# Patient Record
Sex: FEMALE | Race: WHITE | Employment: OTHER | URBAN - METROPOLITAN AREA
[De-identification: names, ages, dates, MRNs, and addresses within clinical notes are randomized per-mention and may not be internally consistent; named-entity substitution may affect disease eponyms.]

---

## 2023-03-20 ENCOUNTER — HOSPITAL ENCOUNTER (OUTPATIENT)
Dept: MRI IMAGING | Age: 73
Discharge: HOME | End: 2023-03-23
Payer: MEDICARE

## 2023-03-20 DIAGNOSIS — M25.552 PAIN IN LEFT HIP: ICD-10-CM

## 2023-03-20 DIAGNOSIS — M16.0 BILATERAL HIP JOINT ARTHRITIS: ICD-10-CM

## 2023-03-20 DIAGNOSIS — M25.551 BILATERAL HIP PAIN: ICD-10-CM

## 2023-03-20 DIAGNOSIS — M25.552 BILATERAL HIP PAIN: ICD-10-CM

## 2023-03-20 PROCEDURE — 73721 MRI JNT OF LWR EXTRE W/O DYE: CPT

## 2023-04-20 ENCOUNTER — HOSPITAL ENCOUNTER (OUTPATIENT)
Dept: MRI IMAGING | Age: 73
Discharge: HOME | End: 2023-04-20
Payer: MEDICARE

## 2023-04-20 DIAGNOSIS — M48.061 SPINAL STENOSIS OF LUMBAR REGION WITHOUT NEUROGENIC CLAUDICATION: ICD-10-CM

## 2023-04-20 PROCEDURE — 6360000004 HC RX CONTRAST MEDICATION: Performed by: ORTHOPAEDIC SURGERY

## 2023-04-20 PROCEDURE — A9579 GAD-BASE MR CONTRAST NOS,1ML: HCPCS | Performed by: ORTHOPAEDIC SURGERY

## 2023-04-20 PROCEDURE — 72158 MRI LUMBAR SPINE W/O & W/DYE: CPT

## 2023-04-20 RX ADMIN — GADOTERIDOL 17 ML: 279.3 INJECTION, SOLUTION INTRAVENOUS at 14:12

## 2024-12-05 NOTE — PROGRESS NOTES
technique; SCS- Strain counter strain; CTM-Connective tissue mobilizations; CR- Contract/Relax; SP- Sustained pressure; PIT- Positional inhibition techniques; STM Soft -tissue mobilization; MM- Myofascial mobilization; TrP-Trigger point release; IASTM- Instrument assisted soft tissue mobilizations, TDN-Trigger point dry needling)    Pt gives verbal consent to internal vaginal assessment/treatment without chaperon present.      Treatment/Session Summary:    Treatment Assessment:   Pt reports good understanding of plan of care, as well as prescribed home exercise program. All questions were answered to pts satisfaction. Pt was invited to call with any further questions or concerns.   Communication/Consultation:  None today  Equipment provided today:  None  Recommendations/Intent for next treatment session: Next visit will focus on   Urge suppression  Perineal massage  Toilet positioning  Manual to hips, adductors, low back, PFM  Coordination training  Mobility for low back pain and hip mobility    >Total Treatment Billable Duration:  65 minutes   Time In: 1010  Time Out: 1115    Ewelina Morrell PT         Charge Capture  Events  [facility] Portal  Appt Desk  Attendance Report     Future Appointments   Date Time Provider Department Center   12/16/2024  8:00 AM Ewelina Morrell, PT [facility]   12/26/2024  9:00 AM Ewelina Morrell, PT [facility]   12/30/2024  1:00 PM Ewelina Morrell, PT [facility]   1/6/2025  1:00 PM Ewelina Morrell, PT [facility]   1/13/2025  1:00 PM Ewelina Morrell, PT [facility]

## 2024-12-05 NOTE — THERAPY EVALUATION
Brandy Grullon  : 1950  Primary: Medicare Part A And B (Medicare)  Secondary: [facility] HEALTH BENEFIT PL [facility] @ [address]  [facility] [address]  Phone: [phone]  Fax: [phone] Plan Frequency: 1x/week for 90 days    Plan of Care/Certification Expiration Date: 03/10/25        Plan of Care/Certification Expiration Date:  Plan of Care/Certification Expiration Date: 03/10/25    Frequency/Duration: Plan Frequency: 1x/week for 90 days      Time In/Out:   Time In: 1010  Time Out: 1115    PT Visit Info:    Plan Frequency: 1x/week for 90 days  Progress Note Due Date: 03/10/25 (or 10th visit)      Visit Count:  1                OUTPATIENT PHYSICAL THERAPY:             Initial Assessment 12/10/2024               Episode (PFPT)         Treatment Diagnosis:     Lack of coordination  Mixed incontinence  Feeling of incomplete bladder emptying  Muscle weakness (generalized)  Myalgia  Muscle spasm  Contributing Diagnosis:  Fecal urgency (R15.2), Frequency of micturition (R35.0), Nocturia (R35.1), Nocturnal enuresis (N39.44), Pelvic and perineal pain (R10.2), Pelvic floor dysfunction in female (M62.98), and Urgency of urination (R39.15)  Medical/Referring Diagnosis:    Pelvic floor weakness      Referring Physician:  Gus Salinas MD MD Orders:  PT Eval and Treat   Return MD Appt:    Date of Onset:      Allergies:  Patient has no allergy information on record.  Restrictions/Precautions:    None      Medications Last Reviewed:  12/10/2024     SUBJECTIVE   History of Injury/Illness (Reason for Referral):  Ms. Grullon is a 74 yo female referred to pelvic floor physical therapy (PFPT) by Gus Salinas MD 2/2 Pelvic floor weakness [N81.89].    She just had a triple hernia repair.  She is not sleeping well at night. She gets 4-5 hours per night maybe. She has sleep apnea.     Urinary:  If she sneezes or coughs she leaks urine. She is waking up 3-5x at night

## 2024-12-10 ENCOUNTER — HOSPITAL ENCOUNTER (OUTPATIENT)
Dept: PHYSICAL THERAPY | Age: 74
Setting detail: RECURRING SERIES
Discharge: HOME | End: 2024-12-13
Payer: MEDICARE

## 2024-12-10 DIAGNOSIS — N39.46 MIXED INCONTINENCE: ICD-10-CM

## 2024-12-10 DIAGNOSIS — M79.10 MYALGIA: ICD-10-CM

## 2024-12-10 DIAGNOSIS — M62.838 MUSCLE SPASM: ICD-10-CM

## 2024-12-10 DIAGNOSIS — R39.14 FEELING OF INCOMPLETE BLADDER EMPTYING: ICD-10-CM

## 2024-12-10 DIAGNOSIS — R27.9 LACK OF COORDINATION: Primary | ICD-10-CM

## 2024-12-10 DIAGNOSIS — M62.81 MUSCLE WEAKNESS (GENERALIZED): ICD-10-CM

## 2024-12-10 PROCEDURE — 97162 PT EVAL MOD COMPLEX 30 MIN: CPT

## 2024-12-10 PROCEDURE — 97530 THERAPEUTIC ACTIVITIES: CPT

## 2024-12-10 ASSESSMENT — PAIN SCALES - GENERAL: PAINLEVEL_OUTOF10: 3

## 2024-12-16 ENCOUNTER — HOSPITAL ENCOUNTER (OUTPATIENT)
Dept: PHYSICAL THERAPY | Age: 74
Setting detail: RECURRING SERIES
Discharge: HOME | End: 2024-12-19
Payer: MEDICARE

## 2024-12-16 PROCEDURE — 97530 THERAPEUTIC ACTIVITIES: CPT

## 2024-12-16 PROCEDURE — 97110 THERAPEUTIC EXERCISES: CPT

## 2024-12-16 ASSESSMENT — PAIN SCALES - GENERAL: PAINLEVEL_OUTOF10: 0

## 2024-12-26 ENCOUNTER — HOSPITAL ENCOUNTER (OUTPATIENT)
Dept: PHYSICAL THERAPY | Age: 74
Setting detail: RECURRING SERIES
Discharge: HOME | End: 2024-12-29
Payer: MEDICARE

## 2024-12-26 PROCEDURE — 97140 MANUAL THERAPY 1/> REGIONS: CPT

## 2024-12-26 PROCEDURE — 97110 THERAPEUTIC EXERCISES: CPT

## 2024-12-26 ASSESSMENT — PAIN SCALES - GENERAL: PAINLEVEL_OUTOF10: 0

## 2024-12-26 NOTE — PROGRESS NOTES
Brandy Grullon  : 1950  Primary: Medicare Part A And B (Medicare)  Secondary: [facility] HEALTH BENEFIT PL [facility] @ [address]  [facility] [address]  Phone: [phone]  Fax: [phone] Plan Frequency: 1x/week for 90 days  Plan of Care/Certification Expiration Date: 03/10/25        Plan of Care/Certification Expiration Date:  Plan of Care/Certification Expiration Date: 03/10/25    Frequency/Duration: Plan Frequency: 1x/week for 90 days      Time In/Out:   Time In: 0900  Time Out: 0957      PT Visit Info:    Progress Note Due Date: 03/10/25 (or 10th visit)      Visit Count:  3    OUTPATIENT PHYSICAL THERAPY:   Treatment Note 2024       Episode  (PFPT)               Treatment Diagnosis:    No data found  Lack of coordination  Mixed incontinence  Feeling of incomplete bladder emptying  Muscle weakness (generalized)  Myalgia  Muscle spasm  Contributing Diagnosis:  Fecal urgency (R15.2), Frequency of micturition (R35.0), Nocturia (R35.1), Nocturnal enuresis (N39.44), Pelvic and perineal pain (R10.2), Pelvic floor dysfunction in female (M62.98), and Urgency of urination (R39.15)     Medical/Referring Diagnosis:    Pelvic floor weakness    Referring Physician:  Gus Salinas MD MD Orders:  PT Eval and Treat   Return MD Appt:     Date of Onset:  No data recorded   Allergies:   Patient has no allergy information on record.  Restrictions/Precautions:   None      Interventions Planned (Treatment may consist of any combination of the following):     See Assessment Note    Subjective Comments:      She just had a triple hernia repair.  She is not sleeping well at night. She gets 4-5 hours per night maybe. She has sleep apnea.      Urinary:  If she sneezes or coughs she leaks urine. She is waking up 3-5x at night to urinate.  She has incomplete emptying and urgency with urination and following urination during the day.   She does have UUI during the day but

## 2024-12-30 ENCOUNTER — APPOINTMENT (OUTPATIENT)
Dept: PHYSICAL THERAPY | Age: 74
End: 2024-12-30
Payer: MEDICARE

## 2025-01-06 ENCOUNTER — HOSPITAL ENCOUNTER (OUTPATIENT)
Dept: PHYSICAL THERAPY | Age: 75
Setting detail: RECURRING SERIES
Discharge: HOME | End: 2025-01-09
Payer: MEDICARE

## 2025-01-06 PROCEDURE — 97140 MANUAL THERAPY 1/> REGIONS: CPT

## 2025-01-06 PROCEDURE — 97110 THERAPEUTIC EXERCISES: CPT

## 2025-01-06 ASSESSMENT — PAIN SCALES - GENERAL: PAINLEVEL_OUTOF10: 0

## 2025-01-06 NOTE — PROGRESS NOTES
Brandy Grullon  : 1950  Primary: Medicare Part A And B (Medicare)  Secondary: [facility] HEALTH BENEFIT PL [facility] @ [address]  [facility] [address]  Phone: [phone]  Fax: [phone] Plan Frequency: 1x/week for 90 days  Plan of Care/Certification Expiration Date: 03/10/25        Plan of Care/Certification Expiration Date:  Plan of Care/Certification Expiration Date: 03/10/25    Frequency/Duration: Plan Frequency: 1x/week for 90 days      Time In/Out:   Time In: 1309  Time Out: 1408      PT Visit Info:    Progress Note Due Date: 03/10/25 (or 10th visit)      Visit Count:  4    OUTPATIENT PHYSICAL THERAPY:   Treatment Note 2025       Episode  (PFPT)               Treatment Diagnosis:    No data found  Lack of coordination  Mixed incontinence  Feeling of incomplete bladder emptying  Muscle weakness (generalized)  Myalgia  Muscle spasm  Contributing Diagnosis:  Fecal urgency (R15.2), Frequency of micturition (R35.0), Nocturia (R35.1), Nocturnal enuresis (N39.44), Pelvic and perineal pain (R10.2), Pelvic floor dysfunction in female (M62.98), and Urgency of urination (R39.15)     Medical/Referring Diagnosis:    Pelvic floor weakness    Referring Physician:  Gus Salinas MD MD Orders:  PT Eval and Treat   Return MD Appt:     Date of Onset:  No data recorded   Allergies:   Patient has no allergy information on record.  Restrictions/Precautions:   None      Interventions Planned (Treatment may consist of any combination of the following):     See Assessment Note    Subjective Comments:      She just had a triple hernia repair.  She is not sleeping well at night. She gets 4-5 hours per night maybe. She has sleep apnea.      Urinary:  If she sneezes or coughs she leaks urine. She is waking up 3-5x at night to urinate.  She has incomplete emptying and urgency with urination and following urination during the day.   She does have UUI during the day but

## 2025-01-13 ENCOUNTER — HOSPITAL ENCOUNTER (OUTPATIENT)
Dept: PHYSICAL THERAPY | Age: 75
Setting detail: RECURRING SERIES
Discharge: HOME | End: 2025-01-16
Payer: MEDICARE

## 2025-01-13 PROCEDURE — 97110 THERAPEUTIC EXERCISES: CPT

## 2025-01-13 PROCEDURE — 97530 THERAPEUTIC ACTIVITIES: CPT

## 2025-01-13 ASSESSMENT — PAIN SCALES - GENERAL: PAINLEVEL_OUTOF10: 0

## 2025-01-13 NOTE — PROGRESS NOTES
Brandy Grullon  : 1950  Primary: Medicare Part A And B (Medicare)  Secondary: [employer] [facility] @ [address]  [facility] [address]  Phone: [phone]  Fax: [phone] Plan Frequency: 1x/week for 90 days  Plan of Care/Certification Expiration Date: 03/10/25        Plan of Care/Certification Expiration Date:  Plan of Care/Certification Expiration Date: 03/10/25    Frequency/Duration: Plan Frequency: 1x/week for 90 days      Time In/Out:   Time In: 1305  Time Out: 1400      PT Visit Info:    Progress Note Due Date: 03/10/25 (or 10th visit)      Visit Count:  5    OUTPATIENT PHYSICAL THERAPY:   Treatment Note 2025       Episode  (PFPT)               Treatment Diagnosis:    No data found  Lack of coordination  Mixed incontinence  Feeling of incomplete bladder emptying  Muscle weakness (generalized)  Myalgia  Muscle spasm  Contributing Diagnosis:  Fecal urgency (R15.2), Frequency of micturition (R35.0), Nocturia (R35.1), Nocturnal enuresis (N39.44), Pelvic and perineal pain (R10.2), Pelvic floor dysfunction in female (M62.98), and Urgency of urination (R39.15)     Medical/Referring Diagnosis:    Pelvic floor weakness    Referring Physician:  Gus Salinas MD MD Orders:  PT Eval and Treat   Return MD Appt:     Date of Onset:  No data recorded   Allergies:   Patient has no allergy information on record.  Restrictions/Precautions:   None      Interventions Planned (Treatment may consist of any combination of the following):     See Assessment Note    Subjective Comments:      She just had a triple hernia repair.  She is not sleeping well at night. She gets 4-5 hours per night maybe. She has sleep apnea.      Urinary:  If she sneezes or coughs she leaks urine. She is waking up 3-5x at night to urinate.  She has incomplete emptying and urgency with urination and following urination during the day.   She does have UUI during the day but this is rare.  She

## 2025-01-24 ENCOUNTER — APPOINTMENT (OUTPATIENT)
Dept: PHYSICAL THERAPY | Age: 75
End: 2025-01-24
Payer: MEDICARE

## 2025-01-29 ENCOUNTER — APPOINTMENT (OUTPATIENT)
Dept: PHYSICAL THERAPY | Age: 75
End: 2025-01-29
Payer: MEDICARE

## 2025-02-05 ENCOUNTER — HOSPITAL ENCOUNTER (OUTPATIENT)
Dept: PHYSICAL THERAPY | Age: 75
Setting detail: RECURRING SERIES
Discharge: HOME | End: 2025-02-08
Payer: MEDICARE

## 2025-02-05 PROCEDURE — 97530 THERAPEUTIC ACTIVITIES: CPT

## 2025-02-05 PROCEDURE — 97140 MANUAL THERAPY 1/> REGIONS: CPT

## 2025-02-05 ASSESSMENT — PAIN SCALES - GENERAL: PAINLEVEL_OUTOF10: 0

## 2025-02-05 NOTE — PROGRESS NOTES
Brandy Grullon  : 1950  Primary: Medicare Part A And B (Medicare)  Secondary: MILY SHANIKAO [facility] @ [address]  [facility] [address]  Phone: [phone]  Fax: [phone] Plan Frequency: 1x/week for 90 days  Plan of Care/Certification Expiration Date: 03/10/25        Plan of Care/Certification Expiration Date:  Plan of Care/Certification Expiration Date: 03/10/25    Frequency/Duration: Plan Frequency: 1x/week for 90 days      Time In/Out:   Time In: 09  Time Out: 0959      PT Visit Info:    Progress Note Due Date: 03/10/25 (or 10th visit)      Visit Count:  6    OUTPATIENT PHYSICAL THERAPY:   Treatment Note 2025       Episode  (PFPT)               Treatment Diagnosis:    No data found  Lack of coordination  Mixed incontinence  Feeling of incomplete bladder emptying  Muscle weakness (generalized)  Myalgia  Muscle spasm  Contributing Diagnosis:  Fecal urgency (R15.2), Frequency of micturition (R35.0), Nocturia (R35.1), Nocturnal enuresis (N39.44), Pelvic and perineal pain (R10.2), Pelvic floor dysfunction in female (M62.98), and Urgency of urination (R39.15)     Medical/Referring Diagnosis:    Pelvic floor weakness    Referring Physician:  Gus Salinas MD MD Orders:  PT Eval and Treat   Return MD Appt:     Date of Onset:  No data recorded   Allergies:   Patient has no allergy information on record.  Restrictions/Precautions:   None      Interventions Planned (Treatment may consist of any combination of the following):     See Assessment Note    Subjective Comments:      She just had a triple hernia repair.  She is not sleeping well at night. She gets 4-5 hours per night maybe. She has sleep apnea.      Urinary:  If she sneezes or coughs she leaks urine. She is waking up 3-5x at night to urinate.  She has incomplete emptying and urgency with urination and following urination during the day.   She does have UUI during the day but this is rare.  She

## 2025-02-12 ENCOUNTER — HOSPITAL ENCOUNTER (OUTPATIENT)
Dept: PHYSICAL THERAPY | Age: 75
Setting detail: RECURRING SERIES
Discharge: HOME | End: 2025-02-15
Payer: MEDICARE

## 2025-02-12 PROCEDURE — 97140 MANUAL THERAPY 1/> REGIONS: CPT

## 2025-02-12 PROCEDURE — 97110 THERAPEUTIC EXERCISES: CPT

## 2025-02-12 ASSESSMENT — PAIN SCALES - GENERAL: PAINLEVEL_OUTOF10: 0

## 2025-02-12 NOTE — PROGRESS NOTES
Brandy Grullon  : 1950  Primary: Medicare Part A And B (Medicare)  Secondary: MILY SHANIKAO [facility] @ [address]  [facility] [address]  Phone: [phone]  Fax: [phone] Plan Frequency: 1x/week for 90 days  Plan of Care/Certification Expiration Date: 03/10/25        Plan of Care/Certification Expiration Date:  Plan of Care/Certification Expiration Date: 03/10/25    Frequency/Duration: Plan Frequency: 1x/week for 90 days      Time In/Out:   Time In: 0902  Time Out: 0956      PT Visit Info:    Progress Note Due Date: 03/10/25 (or 10th visit)      Visit Count:  7    OUTPATIENT PHYSICAL THERAPY:   Treatment Note 2025       Episode  (PFPT)               Treatment Diagnosis:    No data found  Lack of coordination  Mixed incontinence  Feeling of incomplete bladder emptying  Muscle weakness (generalized)  Myalgia  Muscle spasm  Contributing Diagnosis:  Fecal urgency (R15.2), Frequency of micturition (R35.0), Nocturia (R35.1), Nocturnal enuresis (N39.44), Pelvic and perineal pain (R10.2), Pelvic floor dysfunction in female (M62.98), and Urgency of urination (R39.15)     Medical/Referring Diagnosis:    Pelvic floor weakness    Referring Physician:  Gus Salinas MD MD Orders:  PT Eval and Treat   Return MD Appt:     Date of Onset:  No data recorded   Allergies:   Patient has no allergy information on record.  Restrictions/Precautions:   None      Interventions Planned (Treatment may consist of any combination of the following):     See Assessment Note    Subjective Comments:      She just had a triple hernia repair.  She is not sleeping well at night. She gets 4-5 hours per night maybe. She has sleep apnea.      Urinary:  If she sneezes or coughs she leaks urine. She is waking up 3-5x at night to urinate.  She has incomplete emptying and urgency with urination and following urination during the day.   She does have UUI during the day but this is rare.  She

## 2025-02-19 ENCOUNTER — HOSPITAL ENCOUNTER (OUTPATIENT)
Dept: PHYSICAL THERAPY | Age: 75
Setting detail: RECURRING SERIES
Discharge: HOME | End: 2025-02-22
Payer: MEDICARE

## 2025-02-19 PROCEDURE — 97530 THERAPEUTIC ACTIVITIES: CPT

## 2025-02-19 PROCEDURE — 97140 MANUAL THERAPY 1/> REGIONS: CPT

## 2025-02-19 ASSESSMENT — PAIN SCALES - GENERAL: PAINLEVEL_OUTOF10: 0

## 2025-02-19 NOTE — PROGRESS NOTES
in/out of car as well as log roll technique to decrease intraabdominal pressure.    TA Educational Topic Notes Date Completed   Pathology/Anatomy/PFM Function Reviewed  12/10/24   Bladder health education     Urinary urge suppression Reviewed with patient - handout provided     Reviewed timed voiding with patient - every 2 hours 12/16/24 1/13/25   The knack  reviewed  12/26/24   Voiding strategies     Nocturia tips     Bowel/Bladder log     Bowel health education     Constipation care     Diarrhea/Fecal leakage     Colonic massage Reviewed with patient  2/19/25   Toilet positioning Reviewed for double voiding  12/16/24   Defecation dynamics     Sources of fiber Reviewed the importance of fiber sources for bulking stool and completeness of Bms. Encouraged pt to add in fruits and vegetables to diet  2/19/25   Return to intercourse/vaginal trainers/wand     Perineal massage     Sexual positioning     Lubricants/vaginal moisturizers     Vulvovaginal health/vaginal irritants     Body mechanics     Posture/ergonomics     Diaphragmatic breathing Reviewed and practiced with drop and with exhale and gentle kegel     Reviewed deep breathing for PFM relaxation and its application for mobility/toileting 12/10/24        12/16/24   Pain science education     Resources and technology     Other patient education Q+A regarding [employer] saul for pelvic PT exercises and the importance of PFPT exam to guide exercise program \    Supported butterfly stretch and continue with deep breathing   HEP review with Q+A    HEP update and review with patient.    HEP review - gentle mobility, bridges, self massage to adductors. Review of urge suppression & appropriate water intake    Encouraged consistency with mobility HEP  12/10/24            12/26/24  1/6/25      1/13/25        2/5/25        2/19/25     THERAPEUTIC EXERCISE: ( 0 minutes):    Exercises per grid below to improve mobility, strength, and coordination.  Required moderate visual,

## 2025-02-26 ENCOUNTER — HOSPITAL ENCOUNTER (OUTPATIENT)
Dept: PHYSICAL THERAPY | Age: 75
Setting detail: RECURRING SERIES
Discharge: HOME | End: 2025-03-01
Payer: MEDICARE

## 2025-02-26 PROCEDURE — 97140 MANUAL THERAPY 1/> REGIONS: CPT

## 2025-02-26 ASSESSMENT — PAIN SCALES - GENERAL: PAINLEVEL_OUTOF10: 0

## 2025-02-26 NOTE — PROGRESS NOTES
present.    Treatment/Session Summary:    Treatment Assessment:   Pt reports good understanding of plan of care, as well as prescribed home exercise program. All questions were answered to pts satisfaction. Pt was invited to call with any further questions or concerns.   Patient with reduced tension throughout PFM and reduced discomfort with palpation following cues for drops and contract-relax. Patient will be seeing ortho doc for hip pain next week and we will move forward with PT based on his recs.  Communication/Consultation:  None today  Equipment provided today:  None  Recommendations/Intent for next treatment session: Next visit will focus on   Perineal massage  Manual to hips, adductors, low back, PFM  Coordination training  Mobility for low back pain and hip mobility    >Total Treatment Billable Duration:  53 minutes   Time In: 0859  Time Out: 0952    Ewelina Morrell, PT         Charge Capture  Events  [facility] Portal  Appt Desk  Attendance Report     Future Appointments   Date Time Provider Department Center   3/4/2025  9:05 AM Anup Santacruz MD [facility]

## 2025-03-04 ENCOUNTER — OFFICE VISIT (OUTPATIENT)
Dept: ORTHOPEDIC SURGERY | Age: 75
End: 2025-03-04
Payer: MEDICARE

## 2025-03-04 VITALS — HEIGHT: 68 IN | BODY MASS INDEX: 28.49 KG/M2 | WEIGHT: 188 LBS

## 2025-03-04 DIAGNOSIS — M25.551 RIGHT HIP PAIN: Primary | ICD-10-CM

## 2025-03-04 PROCEDURE — 20611 DRAIN/INJ JOINT/BURSA W/US: CPT | Performed by: NURSE PRACTITIONER

## 2025-03-04 PROCEDURE — G8400 PT W/DXA NO RESULTS DOC: HCPCS | Performed by: NURSE PRACTITIONER

## 2025-03-04 PROCEDURE — G8419 CALC BMI OUT NRM PARAM NOF/U: HCPCS | Performed by: NURSE PRACTITIONER

## 2025-03-04 PROCEDURE — 1123F ACP DISCUSS/DSCN MKR DOCD: CPT | Performed by: NURSE PRACTITIONER

## 2025-03-04 PROCEDURE — 99204 OFFICE O/P NEW MOD 45 MIN: CPT | Performed by: NURSE PRACTITIONER

## 2025-03-04 PROCEDURE — 1090F PRES/ABSN URINE INCON ASSESS: CPT | Performed by: NURSE PRACTITIONER

## 2025-03-04 PROCEDURE — 4004F PT TOBACCO SCREEN RCVD TLK: CPT | Performed by: NURSE PRACTITIONER

## 2025-03-04 PROCEDURE — G8428 CUR MEDS NOT DOCUMENT: HCPCS | Performed by: NURSE PRACTITIONER

## 2025-03-04 RX ORDER — METHYLPREDNISOLONE ACETATE 40 MG/ML
40 INJECTION, SUSPENSION INTRA-ARTICULAR; INTRALESIONAL; INTRAMUSCULAR; SOFT TISSUE ONCE
Status: COMPLETED | OUTPATIENT
Start: 2025-03-04 | End: 2025-03-04

## 2025-03-04 RX ADMIN — METHYLPREDNISOLONE ACETATE 40 MG: 40 INJECTION, SUSPENSION INTRA-ARTICULAR; INTRALESIONAL; INTRAMUSCULAR; SOFT TISSUE at 09:59

## 2025-03-04 NOTE — PROGRESS NOTES
ultrasound guidance the hip was injected with 1cc Depo-Medrol along with 3cc of 0.25% marcaine. Using the ultrasound apparatus we were able to visualize the junction of the femoral head neck which is the location that we directed the needle for injection. In addition, we were able to visualize the joint capsule expanding indicating that we were indeed inside the hip capsule. The patient tolerated the procedure well. The patient is directed to contact our office later today to let us know if the injection is working and, if so, how much relief they have gotten. They are informed that they may be sore over the next few days. If the injection works well for the patient they may have another in 3 months. Patient will followup as directed or as needed.    Physical Therapy- the patient would like to attempt treatment with formal aquatic physical therapy. A prescription/referral is given. The goal of physical therapy is to focus on limb strengthening and stretching. We will attempted 4-6 weeks of therapy. In addition I did recommend the patient begin to ride a stationary or recumbent bicycle on their own. Patient may followup either as directed or as needed.           Signed By: MARKELL PIERSON - CNP  March 4, 2025

## 2025-03-10 ENCOUNTER — HOSPITAL ENCOUNTER (OUTPATIENT)
Dept: PHYSICAL THERAPY | Age: 75
Setting detail: RECURRING SERIES
Discharge: HOME | End: 2025-03-13
Payer: MEDICARE

## 2025-03-10 DIAGNOSIS — M54.59 OTHER LOW BACK PAIN: ICD-10-CM

## 2025-03-10 DIAGNOSIS — M25.551 PAIN IN RIGHT HIP: Primary | ICD-10-CM

## 2025-03-10 DIAGNOSIS — R26.2 DIFFICULTY IN WALKING, NOT ELSEWHERE CLASSIFIED: ICD-10-CM

## 2025-03-10 PROCEDURE — 97110 THERAPEUTIC EXERCISES: CPT

## 2025-03-10 PROCEDURE — 97161 PT EVAL LOW COMPLEX 20 MIN: CPT

## 2025-03-10 NOTE — THERAPY EVALUATION
Brandy Grullon  : 1950  Primary: Medicare Part A And B (Medicare)  Secondary: [employer] [facility] @ [address] [place] [address]  Phone: [phone]  Fax: [phone] Plan Frequency: 2-3x week  Plan of Care/Certification Expiration Date: 25        Plan of Care/Certification Expiration Date:  Plan of Care/Certification Expiration Date: 25    Frequency/Duration: Plan Frequency: 2-3x week      Time In/Out:   Time In: 930  Time Out: 1010      PT Visit Info:         Visit Count:  1                OUTPATIENT PHYSICAL THERAPY:             Initial Assessment 3/10/2025               Episode (Right hip pain)         Treatment Diagnosis:     Pain in right hip  Other low back pain  Difficulty in walking, not elsewhere classified  Medical/Referring Diagnosis:    Right hip pain [M25.551]    Referring Physician:  Logan Lezama APRN - CNP MD Orders:  PT Eval and Treat   Return MD Appt:    Future Appointments   Date Time Provider Department Center   3/14/2025 10:45 AM Rehana Hendrickson, PT [facility]   3/18/2025 11:00 AM Chana Albright, PTA [facility]   3/20/2025  1:00 PM Rehana Hendrickson, PT [facility]   3/26/2025  9:30 AM Chana Albright, PTA [facility]   2025  9:30 AM Chana Albright, PTA [facility]   2025  9:30 AM Chana Albright, PTA [facility]       Date of Onset:    Chronic   Allergies:  Patient has no allergy information on record.  Restrictions/Precautions:    None      Medications Last Reviewed: 3/10/2025     SUBJECTIVE   History of Injury/Illness (Reason for Referral):  \"They say I need a hip replacement so I have 4 months to get in better shape.I can't do as much on land, so I'm trying the pool. I used to have a pool.    Patient Stated Goal(s):  \"I want to get in better shape before my surgery\"  Initial Pain Level:      0 /10   Post Session Pain Level:      0/10  Past Medical History/Comorbidities:   Ms. Grullon

## 2025-03-10 NOTE — PROGRESS NOTES
Brandy Grullon  : 1950  Primary: Medicare Part A And B (Medicare)  Secondary: [employer] [facility] @ [address] [place] [address]  Phone: [phone]  Fax: [phone] Plan Frequency: 2-3x week    Plan of Care/Certification Expiration Date: 25        Plan of Care/Certification Expiration Date:  Plan of Care/Certification Expiration Date: 25    Frequency/Duration: Plan Frequency: 2-3x week      Time In/Out:   Time In: 30  Time Out: 1010      PT Visit Info:         Visit Count:  1    OUTPATIENT PHYSICAL THERAPY:   Treatment Note 3/10/2025       Episode  (Right hip pain)               Treatment Diagnosis:    Pain in right hip  Other low back pain  Difficulty in walking, not elsewhere classified  Medical/Referring Diagnosis:    Right hip pain [M25.551]    Referring Physician:  Logan Lezama APRN - CNP MD Orders:  PT Eval and Treat   Return MD Appt:    Future Appointments   Date Time Provider Department Center   3/14/2025 10:45 AM Rehana Hendrickson, PT [facility]   3/18/2025 11:00 AM Chana Albright, PTA [facility]   3/20/2025  1:00 PM Rehana Hendrickson, PT [facility]   3/26/2025  9:30 AM Chana Albright, PTA [facility]   2025  9:30 AM Chana Albright, PTA [facility]   2025  9:30 AM Chana Albright, PTA [facility]        Date of Onset:  No data recorded   Allergies:   Patient has no allergy information on record.  Restrictions/Precautions:   None      Interventions Planned (Treatment may consist of any combination of the following):     See Assessment Note    Subjective Comments:   I want to get in shape for surgery.   Initial Pain Level:     0 /10  L scapular pain voiced.   Post Session Pain Level:     0  /10  Medications Last Reviewed: 3/10/2025  Updated Objective Findings:  See Evaluation Note from today  Treatment   THERAPEUTIC EXERCISE: (24 minutes):    Exercises per grid below to improve mobility, strength, and

## 2025-03-18 ENCOUNTER — HOSPITAL ENCOUNTER (OUTPATIENT)
Dept: PHYSICAL THERAPY | Age: 75
Setting detail: RECURRING SERIES
Discharge: HOME | End: 2025-03-21
Payer: MEDICARE

## 2025-03-18 PROCEDURE — 97113 AQUATIC THERAPY/EXERCISES: CPT

## 2025-03-18 NOTE — PROGRESS NOTES
on ROM, strength, functional activities.    >Total Treatment Billable Duration:  45 minutes   Time In: 1100  Time Out: 1145     Chana Albright PTA         Charge Capture  Events  [employer] Portal  Appt Desk  Attendance Report     Future Appointments   Date Time Provider Department Center   3/20/2025  1:00 PM Rehana Hendrickson, PT [facility]   3/26/2025  9:30 AM Chana Albright PTA [facility]   4/2/2025  9:30 AM Chana Albright PTA [facility]   4/9/2025  9:30 AM Chana Albright PTA [facility]

## 2025-03-26 ENCOUNTER — HOSPITAL ENCOUNTER (OUTPATIENT)
Dept: PHYSICAL THERAPY | Age: 75
Setting detail: RECURRING SERIES
Discharge: HOME | End: 2025-03-29
Payer: MEDICARE

## 2025-03-26 PROCEDURE — 97110 THERAPEUTIC EXERCISES: CPT

## 2025-03-26 PROCEDURE — 97113 AQUATIC THERAPY/EXERCISES: CPT

## 2025-03-26 NOTE — PROGRESS NOTES
Brandy Grullon  : 1950  Primary: Medicare Part A And B (Medicare)  Secondary: [employer] [facility] @ [address] [place] [address]  Phone: [phone]  Fax: [phone] Plan Frequency: 2-3x week    Plan of Care/Certification Expiration Date: 25        Plan of Care/Certification Expiration Date:  Plan of Care/Certification Expiration Date: 25    Frequency/Duration: Plan Frequency: 2-3x week      Time In/Out:   Time In: 930  Time Out: 1015      PT Visit Info:         Visit Count:  3    OUTPATIENT PHYSICAL THERAPY:   Treatment Note 3/26/2025       Episode  (Right hip pain)               Treatment Diagnosis:    No data found  Medical/Referring Diagnosis:    No admission diagnoses are documented for this encounter.    Referring Physician:  Logan Lezama APRN - CNP MD Orders:  PT Eval and Treat   Return MD Appt:    Future Appointments   Date Time Provider Department Center   3/31/2025  8:00 AM Chana Albright W, PTA [facility]   2025  9:30 AM Chana Albright W, PTA [facility]   4/3/2025  9:00 AM Ewelina Morrell, PT [facility]   4/3/2025 10:30 AM Rehana Hendrickson, PT [facility]   2025 11:00 AM Ewelina Morrell, PT [facility]   2025  9:30 AM Chana Albright W, PTA [facility]   2025  9:30 AM Chana Albright W, PTA [facility]   2025  8:45 AM Chana Albright W, PTA [facility]   2025  8:45 AM Chana Albright W, PTA [facility]   2025  9:00 AM Ewelina Morrell, PT [facility]   2025 10:30 AM Rehana Hendrickson, PT [facility]   2025  8:45 AM Chana Albright W, PTA [facility]   2025  9:00 AM Ewelina Morrell, PT [facility]   2025 10:30 AM Rehana Hendrickson, PT [facility]   2025  8:45 AM Chana Albright, PTA [facility]   2025  8:45 AM Chana Albright, PTA [facility]   2025  8:45 AM Chana Albright, PTA [facility]        Date of Onset:  No data recorded   Allergies:

## 2025-03-31 ENCOUNTER — HOSPITAL ENCOUNTER (OUTPATIENT)
Dept: PHYSICAL THERAPY | Age: 75
Setting detail: RECURRING SERIES
Discharge: HOME | End: 2025-04-03
Payer: MEDICARE

## 2025-03-31 PROCEDURE — 97113 AQUATIC THERAPY/EXERCISES: CPT

## 2025-03-31 NOTE — PROGRESS NOTES
Brandy Grullon  : 1950  Primary: Medicare Part A And B (Medicare)  Secondary: [employer] - [employer] [facility] @ [address] [place] [address]  Phone: [phone]  Fax: [phone] Plan Frequency: 2-3x week    Plan of Care/Certification Expiration Date: 25        Plan of Care/Certification Expiration Date:  Plan of Care/Certification Expiration Date: 25    Frequency/Duration: Plan Frequency: 2-3x week      Time In/Out:   Time In: 0800  Time Out: 0845      PT Visit Info:         Visit Count:  4    OUTPATIENT PHYSICAL THERAPY:   Treatment Note 3/31/2025       Episode  (Right hip pain)               Treatment Diagnosis:    No data found  Medical/Referring Diagnosis:    No admission diagnoses are documented for this encounter.    Referring Physician:  Logan Lezama APRN - CNP MD Orders:  PT Eval and Treat   Return MD Appt:    Future Appointments   Date Time Provider Department Center   2025  9:30 AM Chana Albright, PTA [facility]   4/3/2025  9:00 AM Ewelina Morrell, PT [facility]   4/3/2025 10:30 AM Rehana Hendrickson, PT [facility]   2025 11:00 AM Ewelina Morrell, PT [facility]   2025  9:30 AM Chana Albright, PTA [facility]   2025  9:30 AM Chana Albright, PTA [facility]   2025  8:45 AM Chana Albright W, PTA [facility]   2025  8:45 AM Chana Albright W, PTA [facility]   2025  9:00 AM Ewelina Morrell, PT [facility]   2025 10:30 AM Rehana Hendrickson, PT [facility]   2025  8:45 AM Chana Albright, PTA [facility]   2025  9:00 AM Ewelina Morrell, PT [facility]   2025 10:30 AM Rehana Hendrickson, PT [facility]   2025  8:45 AM Chana Albright, PTA [facility]   2025  8:45 AM Chana Albright, PTA [facility]   2025  8:45 AM Chana Albright, PTA [facility]        Date of Onset:  No data recorded   Allergies:   Patient has no allergy information on

## 2025-04-02 ENCOUNTER — HOSPITAL ENCOUNTER (OUTPATIENT)
Dept: PHYSICAL THERAPY | Age: 75
Setting detail: RECURRING SERIES
Discharge: HOME | End: 2025-04-05
Payer: MEDICARE

## 2025-04-02 PROCEDURE — 97113 AQUATIC THERAPY/EXERCISES: CPT

## 2025-04-02 NOTE — PROGRESS NOTES
Brandy Grullon  : 1950  Primary: Medicare Part A And B (Medicare)  Secondary: [employer] [facility] @ [address] [place] [address]  Phone: [phone]  Fax: [phone] Plan Frequency: 2-3x week    Plan of Care/Certification Expiration Date: 25        Plan of Care/Certification Expiration Date:  Plan of Care/Certification Expiration Date: 25    Frequency/Duration: Plan Frequency: 2-3x week      Time In/Out:   Time In: 0930  Time Out: 1015      PT Visit Info:         Visit Count:  5    OUTPATIENT PHYSICAL THERAPY:   Treatment Note 2025       Episode  (Right hip pain)               Treatment Diagnosis:    No data found  Medical/Referring Diagnosis:    No admission diagnoses are documented for this encounter.    Referring Physician:  Logan Lezama APRN - CNP MD Orders:  PT Eval and Treat   Return MD Appt:    Future Appointments   Date Time Provider Department Center   4/3/2025  9:00 AM Ewelina Morrell, PT [facility]   4/3/2025 10:30 AM Rehana Hendrickson, PT [facility]   2025 11:00 AM Ewelina Morrell, PT [facility]   2025  9:30 AM Chana Albright, PTA [facility]   2025  9:30 AM Chana Albright W, PTA [facility]   2025  8:45 AM Chana Albright W, PTA [facility]   2025  8:45 AM Chana Albright W, PTA [facility]   2025  9:00 AM Ewelina Morrell, PT [facility]   2025 10:30 AM Rehana Hendrickson, PT [facility]   2025  8:45 AM Chana Albright, PTA [facility]   2025  9:00 AM Ewelina Morerll, PT [facility]   2025 10:30 AM Rehana Hendrickson, PT [facility]   2025  8:45 AM Chana Albright, PTA [facility]   2025  8:45 AM Chana Albright, PTA [facility]   2025  8:45 AM Chana Albright, PTA [facility]        Date of Onset:  No data recorded   Allergies:   Patient has no allergy information on record.  Restrictions/Precautions:   None      Interventions Planned (Treatment

## 2025-04-03 ENCOUNTER — HOSPITAL ENCOUNTER (OUTPATIENT)
Dept: PHYSICAL THERAPY | Age: 75
Setting detail: RECURRING SERIES
Discharge: HOME | End: 2025-04-06
Payer: MEDICARE

## 2025-04-03 PROCEDURE — 97110 THERAPEUTIC EXERCISES: CPT

## 2025-04-03 PROCEDURE — 97530 THERAPEUTIC ACTIVITIES: CPT

## 2025-04-03 ASSESSMENT — PAIN SCALES - GENERAL: PAINLEVEL_OUTOF10: 0

## 2025-04-03 NOTE — THERAPY RECERTIFICATION
scale from 0-3; 0 representing \"Not at all\", 3 representing \"Quite a bit\". The mean value is taken from all the answered items, then multiplied by 100 to obtain the scale score, ranging from 0-100.  This process is repeated for each column representing bowel, bladder, and pelvic pain.    Goals: (Goals have been discussed and agreed upon with patient.)  Short-Term Functional Goals: Time Frame: 6 weeks  Patient will demonstrate understanding of and ability to teach back appropriate water and fiber intake, toileting frequency, and positioning for improved self-management of symptoms.  MET  Patient will demonstrate independence with basic pelvic floor muscle (PFM) home exercises program (HEP) to improve awareness, coordination, and timing of PFM. MET  Patient will demonstrate understanding of and ability to teach back appropriate water intake, bladder irritants, toileting frequency, and positioning for improved self-management of symptoms. MET  Patient will demonstrate appropriate use of the pelvic floor muscle group (quick flicks and/or drops), without compensation, to implement urge suppression appropriately with urgency of urination and decrease the number of pads per day or UI episodes by 50%. MET    Discharge Goals: Time Frame: 12 weeks  Patient will demonstrate ability to voluntarily contract the pelvic floor muscles prior to a cough or sneeze for decreased leakage during increases in intra-abdominal pressure. PROGRESSING  Patient will demonstrate independence with an advanced HEP for general conditioning, core stabilization, and mobility to facilitate carry over and independent management of symptoms. MET  Patient will be independent with implementation of a timed voiding schedule and use of urge suppression to reduce urinary frequency to 6-8x/day and 1-2x/night. PROGRESSING            Medical Necessity:   > Skilled intervention continues to be required due to above mentioned deficits.  Reason For Services/Other

## 2025-04-03 NOTE — PROGRESS NOTES
Brandy Grullon  : 1950  Primary: Medicare Part A And B (Medicare)  Secondary: MILY SHANIKAO [facility] @ [address]ulee  [facility] [address]  Phone: [phone]  Fax: [phone] Plan Frequency: 1x/week for 90 days  Plan of Care/Certification Expiration Date: 25        Plan of Care/Certification Expiration Date:  Plan of Care/Certification Expiration Date: 25    Frequency/Duration: Plan Frequency: 1x/week for 90 days      Time In/Out:   Time In: 09  Time Out: 09      PT Visit Info:    Progress Note Due Date: 25 (or 20th visit)      Visit Count:  10    OUTPATIENT PHYSICAL THERAPY:   Treatment Note 4/3/2025       Episode  (PFPT)               Treatment Diagnosis:    No data found  Lack of coordination  Mixed incontinence  Feeling of incomplete bladder emptying  Muscle weakness (generalized)  Myalgia  Muscle spasm  Contributing Diagnosis:  Fecal urgency (R15.2), Frequency of micturition (R35.0), Nocturia (R35.1), Nocturnal enuresis (N39.44), Pelvic and perineal pain (R10.2), Pelvic floor dysfunction in female (M62.98), and Urgency of urination (R39.15)     Medical/Referring Diagnosis:    Pelvic floor weakness    Referring Physician:  Gus Salinas MD MD Orders:  PT Eval and Treat   Return MD Appt:     Date of Onset:  No data recorded   Allergies:   Patient has no allergy information on record.  Restrictions/Precautions:   None      Interventions Planned (Treatment may consist of any combination of the following):     See Assessment Note    Subjective Comments:      She just had a triple hernia repair.  She is not sleeping well at night. She gets 4-5 hours per night maybe. She has sleep apnea.      Urinary:  If she sneezes or coughs she leaks urine. She is waking up 3-5x at night to urinate.  She has incomplete emptying and urgency with urination and following urination during the day.   She does have UUI during the day but this is rare.  She

## 2025-04-07 ENCOUNTER — HOSPITAL ENCOUNTER (OUTPATIENT)
Dept: PHYSICAL THERAPY | Age: 75
Setting detail: RECURRING SERIES
Discharge: HOME | End: 2025-04-10
Payer: MEDICARE

## 2025-04-07 PROCEDURE — 97140 MANUAL THERAPY 1/> REGIONS: CPT

## 2025-04-07 PROCEDURE — 97110 THERAPEUTIC EXERCISES: CPT

## 2025-04-07 ASSESSMENT — PAIN SCALES - GENERAL: PAINLEVEL_OUTOF10: 0

## 2025-04-07 NOTE — PROGRESS NOTES
Brandy Grullon  : 1950  Primary: Medicare Part A And B (Medicare)  Secondary: MILY SHANIKAO [facility] [place] @ [address]IO Julee  [facility] [address]  Phone: [phone]  Fax: [phone] Plan Frequency: 1x/week for 90 days  Plan of Care/Certification Expiration Date: 25        Plan of Care/Certification Expiration Date:  Plan of Care/Certification Expiration Date: 25    Frequency/Duration: Plan Frequency: 1x/week for 90 days      Time In/Out:   Time In: 1104  Time Out: 1158      PT Visit Info:    Progress Note Due Date: 25 (or 20th visit)      Visit Count:  11    OUTPATIENT PHYSICAL THERAPY:   Treatment Note 2025       Episode  (PFPT)               Treatment Diagnosis:    No data found  Lack of coordination  Mixed incontinence  Feeling of incomplete bladder emptying  Muscle weakness (generalized)  Myalgia  Muscle spasm  Contributing Diagnosis:  Fecal urgency (R15.2), Frequency of micturition (R35.0), Nocturia (R35.1), Nocturnal enuresis (N39.44), Pelvic and perineal pain (R10.2), Pelvic floor dysfunction in female (M62.98), and Urgency of urination (R39.15)     Medical/Referring Diagnosis:    Pelvic floor weakness    Referring Physician:  Gus Salinas MD MD Orders:  PT Eval and Treat   Return MD Appt:     Date of Onset:  No data recorded   Allergies:   Patient has no allergy information on record.  Restrictions/Precautions:   None      Interventions Planned (Treatment may consist of any combination of the following):     See Assessment Note    Subjective Comments:      She just had a triple hernia repair.  She is not sleeping well at night. She gets 4-5 hours per night maybe. She has sleep apnea.      Urinary:  If she sneezes or coughs she leaks urine. She is waking up 3-5x at night to urinate.  She has incomplete emptying and urgency with urination and following urination during the day.   She does have UUI during the day but this is rare.  She

## 2025-04-09 ENCOUNTER — HOSPITAL ENCOUNTER (OUTPATIENT)
Dept: PHYSICAL THERAPY | Age: 75
Setting detail: RECURRING SERIES
Discharge: HOME | End: 2025-04-12
Payer: MEDICARE

## 2025-04-09 PROCEDURE — 97113 AQUATIC THERAPY/EXERCISES: CPT

## 2025-04-09 NOTE — PROGRESS NOTES
Swimming         Stretches         Hamstrings   3 x 35 sec B 3 x 35 sec     Heelcords         Piriformis         Neck               Treatment/Session Summary:    Treatment Assessment: Patient tolerated all aquatic exercises well today. Patient plans to try the pool at [employer]. Instructed patient to continue home exercises as directed.  Verbalized understanding of HEP and POC.      Communication/Consultation:  Therapy Evaluation sent to referring provider  Equipment provided today:  HEP  Recommendations/Intent for next treatment session: Next visit will focus on ROM, strength, functional activities.    >Total Treatment Billable Duration:  45 minutes   Time In: 0930  Time Out: 1015     Chana Albright PTA         Charge Capture  Events  [email or web address] Portal  Appt Desk  Attendance Report     Future Appointments   Date Time Provider Department Center   4/11/2025  9:30 AM Chana Albright, PTA [facility]   4/14/2025  8:45 AM Chana Albright, PTA [facility]   4/16/2025  8:45 AM Chana Albright, PTA [facility]   4/17/2025  9:00 AM Ewelina Morrell, PT [facility]   4/17/2025 10:30 AM Rehana Hendrickson, PT [facility]   4/21/2025  8:45 AM Chana Albright, PTA [facility]   4/24/2025  9:00 AM Ewelina Morrell, PT [facility]   4/24/2025 10:30 AM Rehana Hendrickson, PT [facility]   4/25/2025  8:45 AM Chana Albright, PTA [facility]   4/28/2025  8:45 AM Chana Albright, PTA [facility]   5/2/2025  8:45 AM Chana Albright, PTA [facility]   5/2/2025 10:30 AM Rehana Hendrickson, PT [facility]

## 2025-04-11 ENCOUNTER — HOSPITAL ENCOUNTER (OUTPATIENT)
Dept: PHYSICAL THERAPY | Age: 75
Setting detail: RECURRING SERIES
Discharge: HOME | End: 2025-04-14
Payer: MEDICARE

## 2025-04-11 PROCEDURE — 97113 AQUATIC THERAPY/EXERCISES: CPT

## 2025-04-11 NOTE — PROGRESS NOTES
Brandy Grullon  : 1950  Primary: Medicare Part A And B (Medicare)  [facility] [address] [place] [address]  Phone: [phone]  Fax: [phone] Plan Frequency: 2-3x week    Plan of Care/Certification Expiration Date: 25        Plan of Care/Certification Expiration Date:  Plan of Care/Certification Expiration Date: 25    Frequency/Duration: Plan Frequency: 2-3x week      Time In/Out:   Time In: 0930  Time Out: 1015      PT Visit Info:         Visit Count:  7    OUTPATIENT PHYSICAL THERAPY:   Treatment Note 2025       Episode  (Right hip pain)               Treatment Diagnosis:    No data found  Medical/Referring Diagnosis:    No admission diagnoses are documented for this encounter.    Referring Physician:  Logan Lezama APRN - CNP MD Orders:  PT Eval and Treat   Return MD Appt:    Future Appointments   Date Time Provider Department Center   2025  8:45 AM Chana Albright W, PTA [facility]   2025  8:45 AM ChicotChana cardozo W, PTA [facility]   2025  9:00 AM Ewelina Morrell, PT [facility]   2025 10:30 AM Rehana Hendrickson, PT [facility]   2025  8:45 AM ChicotChana cardozo W, PTA [facility]   2025  9:00 AM Ewelina Morrell, PT [facility]   2025 10:30 AM Rehana Hendrickson, PT [facility]   2025  8:45 AM ChicotMeghanChana W, PTA [facility]   2025  8:45 AM ChicotMeghanChana W, PTA [facility]   2025  8:45 AM ChicotMeghanChana W, PTA [facility]   2025 10:30 AM Rehana Hendrickson, PT [facility]        Date of Onset:  No data recorded   Allergies:   Patient has no allergy information on record.  Restrictions/Precautions:   None      Interventions Planned (Treatment may consist of any combination of the following):     See Assessment Note    Subjective Comments: Patient reports hips are a little sore today.    Initial Pain Level:     3 /10    Post Session Pain Level:     2

## 2025-04-14 ENCOUNTER — APPOINTMENT (OUTPATIENT)
Dept: PHYSICAL THERAPY | Age: 75
End: 2025-04-14
Payer: MEDICARE

## 2025-04-16 ENCOUNTER — HOSPITAL ENCOUNTER (OUTPATIENT)
Dept: PHYSICAL THERAPY | Age: 75
Setting detail: RECURRING SERIES
Discharge: HOME | End: 2025-04-19
Payer: MEDICARE

## 2025-04-16 PROCEDURE — 97113 AQUATIC THERAPY/EXERCISES: CPT

## 2025-04-17 ENCOUNTER — HOSPITAL ENCOUNTER (OUTPATIENT)
Dept: PHYSICAL THERAPY | Age: 75
Setting detail: RECURRING SERIES
End: 2025-04-17
Payer: MEDICARE

## 2025-04-17 NOTE — PROGRESS NOTES
Brandy Grullon  : 1950  Primary: Medicare Part A And B (Medicare)  Secondary: MILY SHANIKAO [facility] @ [address]e  [facility] [address]  Phone: [phone]  Fax: [phone] Plan Frequency: 1x/week for 90 days  Plan of Care/Certification Expiration Date: 25        Plan of Care/Certification Expiration Date:  Plan of Care/Certification Expiration Date: 25    Frequency/Duration: Plan Frequency: 1x/week for 90 days      Time In/Out:          PT Visit Info:    Progress Note Due Date: 25 (or 20th visit)      Visit Count:  12    OUTPATIENT PHYSICAL THERAPY:   Treatment Note 2025       Episode  (PFPT)               Treatment Diagnosis:    No data found  Lack of coordination  Mixed incontinence  Feeling of incomplete bladder emptying  Muscle weakness (generalized)  Myalgia  Muscle spasm  Contributing Diagnosis:  Fecal urgency (R15.2), Frequency of micturition (R35.0), Nocturia (R35.1), Nocturnal enuresis (N39.44), Pelvic and perineal pain (R10.2), Pelvic floor dysfunction in female (M62.98), and Urgency of urination (R39.15)     Medical/Referring Diagnosis:    Pelvic floor weakness    Referring Physician:  Gus Salinas MD MD Orders:  PT Eval and Treat   Return MD Appt:     Date of Onset:  No data recorded   Allergies:   Patient has no allergy information on record.  Restrictions/Precautions:   None      Interventions Planned (Treatment may consist of any combination of the following):     See Assessment Note    Subjective Comments:      She just had a triple hernia repair.  She is not sleeping well at night. She gets 4-5 hours per night maybe. She has sleep apnea.      Urinary:  If she sneezes or coughs she leaks urine. She is waking up 3-5x at night to urinate.  She has incomplete emptying and urgency with urination and following urination during the day.   She does have UUI during the day but this is rare.  She typically drinks fluids up

## 2025-04-21 ENCOUNTER — APPOINTMENT (OUTPATIENT)
Dept: PHYSICAL THERAPY | Age: 75
End: 2025-04-21
Payer: MEDICARE

## 2025-04-21 ENCOUNTER — PATIENT MESSAGE (OUTPATIENT)
Dept: ORTHOPEDIC SURGERY | Age: 75
End: 2025-04-21

## 2025-04-21 NOTE — TELEPHONE ENCOUNTER
Patient has already called in to schedule an appointment with Dr. Santacruz.   normal for ht. for wt./other (specify)

## 2025-04-24 ENCOUNTER — APPOINTMENT (OUTPATIENT)
Dept: PHYSICAL THERAPY | Age: 75
End: 2025-04-24
Payer: MEDICARE

## 2025-04-25 ENCOUNTER — APPOINTMENT (OUTPATIENT)
Dept: PHYSICAL THERAPY | Age: 75
End: 2025-04-25
Payer: MEDICARE

## 2025-04-28 ENCOUNTER — APPOINTMENT (OUTPATIENT)
Dept: PHYSICAL THERAPY | Age: 75
End: 2025-04-28
Payer: MEDICARE

## 2025-05-22 ENCOUNTER — OFFICE VISIT (OUTPATIENT)
Dept: ORTHOPEDIC SURGERY | Age: 75
End: 2025-05-22
Payer: MEDICARE

## 2025-05-22 DIAGNOSIS — M16.11 PRIMARY OSTEOARTHRITIS OF RIGHT HIP: Primary | ICD-10-CM

## 2025-05-22 PROCEDURE — 4004F PT TOBACCO SCREEN RCVD TLK: CPT | Performed by: ORTHOPAEDIC SURGERY

## 2025-05-22 PROCEDURE — 1090F PRES/ABSN URINE INCON ASSESS: CPT | Performed by: ORTHOPAEDIC SURGERY

## 2025-05-22 PROCEDURE — 1123F ACP DISCUSS/DSCN MKR DOCD: CPT | Performed by: ORTHOPAEDIC SURGERY

## 2025-05-22 PROCEDURE — G8419 CALC BMI OUT NRM PARAM NOF/U: HCPCS | Performed by: ORTHOPAEDIC SURGERY

## 2025-05-22 PROCEDURE — 99204 OFFICE O/P NEW MOD 45 MIN: CPT | Performed by: ORTHOPAEDIC SURGERY

## 2025-05-22 PROCEDURE — G8400 PT W/DXA NO RESULTS DOC: HCPCS | Performed by: ORTHOPAEDIC SURGERY

## 2025-05-22 PROCEDURE — G8428 CUR MEDS NOT DOCUMENT: HCPCS | Performed by: ORTHOPAEDIC SURGERY

## 2025-05-22 NOTE — PROGRESS NOTES
Patient ID:    Brandy Grullon  [id number]  74 y.o.  1950    Today: May 22, 2025          Chief Complaint: Right hip pain        Patient reports longstanding history of right hip pain.  The pain is predominately localized to the anterior groin and is described as a  general ache with occasional sharp pain.  They rate the pain ranging from 3-8 on the pain scale occurring in a cyclical fashion with periods of acute exacerbation. Symptoms are exacerbated with getting into and out of their vehicle, crossing their legs, and attempting to put on socks/shoes. No significant pain noted with laying on the affected hip. The patient's hip pain cause moderate to severe limitations in the activities of rising from bed, putting on socks/shoes, rising from a sitting position, bending towards the floor and kneeling, twisting and pivoting on the limb and squatting. In addition, at times the patient reports extreme difficulty with these activities. No numbness of tingling going down the extremity.  Patient has attempted prior conservative treatment including medications, activity modification, at least 6 weeks of strengthening exercise/physical therapy, weight loss (if applicable), +/-hip injections consisting or either trochanteric bursitis injections or intra-articular injections.        Past Medical History:  No past medical history on file.    Past Surgical History:  No past surgical history on file.     Medications:     Prior to Admission medications    Not on File       Family History:   No family history on file.    Social History:      Social History     Tobacco Use    Smoking status: Not on file    Smokeless tobacco: Not on file   Substance Use Topics    Alcohol use: Not on file         Allergies:    Not on File       ROS:  Review of systems has been performed and reviewed. Pertinent positives and negatives pertaining to orthopaedic issues has been noted. Non-orthopaedic issues are deferred to PCP      Objective:

## 2025-05-23 DIAGNOSIS — M16.11 PRIMARY OSTEOARTHRITIS OF RIGHT HIP: Primary | ICD-10-CM

## 2025-06-03 ENCOUNTER — OFFICE VISIT (OUTPATIENT)
Age: 75
End: 2025-06-03
Payer: MEDICARE

## 2025-06-03 DIAGNOSIS — M51.360 DEGENERATION OF INTERVERTEBRAL DISC OF LUMBAR REGION WITH DISCOGENIC BACK PAIN: ICD-10-CM

## 2025-06-03 DIAGNOSIS — M47.816 FACET ARTHROPATHY, LUMBAR: ICD-10-CM

## 2025-06-03 DIAGNOSIS — Z98.1 HISTORY OF THORACIC SPINAL FUSION: ICD-10-CM

## 2025-06-03 DIAGNOSIS — M54.50 LOW BACK PAIN, UNSPECIFIED BACK PAIN LATERALITY, UNSPECIFIED CHRONICITY, UNSPECIFIED WHETHER SCIATICA PRESENT: Primary | ICD-10-CM

## 2025-06-03 PROCEDURE — 1123F ACP DISCUSS/DSCN MKR DOCD: CPT | Performed by: PHYSICIAN ASSISTANT

## 2025-06-03 PROCEDURE — G8427 DOCREV CUR MEDS BY ELIG CLIN: HCPCS | Performed by: PHYSICIAN ASSISTANT

## 2025-06-03 PROCEDURE — 1090F PRES/ABSN URINE INCON ASSESS: CPT | Performed by: PHYSICIAN ASSISTANT

## 2025-06-03 PROCEDURE — G8419 CALC BMI OUT NRM PARAM NOF/U: HCPCS | Performed by: PHYSICIAN ASSISTANT

## 2025-06-03 PROCEDURE — 1159F MED LIST DOCD IN RCRD: CPT | Performed by: PHYSICIAN ASSISTANT

## 2025-06-03 PROCEDURE — G8400 PT W/DXA NO RESULTS DOC: HCPCS | Performed by: PHYSICIAN ASSISTANT

## 2025-06-03 PROCEDURE — 4004F PT TOBACCO SCREEN RCVD TLK: CPT | Performed by: PHYSICIAN ASSISTANT

## 2025-06-03 PROCEDURE — 99204 OFFICE O/P NEW MOD 45 MIN: CPT | Performed by: PHYSICIAN ASSISTANT

## 2025-06-03 RX ORDER — DILTIAZEM HYDROCHLORIDE 120 MG/1
1 CAPSULE, EXTENDED RELEASE ORAL EVERY MORNING
COMMUNITY

## 2025-06-03 RX ORDER — DILTIAZEM HYDROCHLORIDE 120 MG/1
120 CAPSULE, EXTENDED RELEASE ORAL DAILY
COMMUNITY

## 2025-06-03 RX ORDER — ALENDRONATE SODIUM 35 MG/1
TABLET ORAL
COMMUNITY
Start: 2025-04-23

## 2025-06-03 RX ORDER — CYCLOBENZAPRINE HCL 10 MG
TABLET ORAL
COMMUNITY

## 2025-06-03 RX ORDER — ALBUTEROL SULFATE 90 UG/1
INHALANT RESPIRATORY (INHALATION)
COMMUNITY
Start: 2025-01-27

## 2025-06-03 RX ORDER — CLINDAMYCIN PHOSPHATE 11.9 MG/ML
SOLUTION TOPICAL
COMMUNITY

## 2025-06-03 RX ORDER — LANOLIN ALCOHOL/MO/W.PET/CERES
1000 CREAM (GRAM) TOPICAL
COMMUNITY

## 2025-06-03 RX ORDER — ACETAMINOPHEN 325 MG/1
TABLET ORAL
COMMUNITY

## 2025-06-03 RX ORDER — ATORVASTATIN CALCIUM 10 MG/1
TABLET, FILM COATED ORAL
COMMUNITY

## 2025-06-03 RX ORDER — DILTIAZEM HCL 60 MG
30 TABLET ORAL DAILY
COMMUNITY

## 2025-06-03 RX ORDER — BISACODYL 5 MG/1
5 TABLET, DELAYED RELEASE ORAL DAILY PRN
COMMUNITY

## 2025-06-03 NOTE — PROGRESS NOTES
Name: Brandy Grullon  YOB: 1950  Gender: female  MRN: [id number]    CC: Back Pain (Low back pain)       History of Present Illness  The patient is a 74-year-old female presenting with right hip pain.    She was referred by Dr. Santacruz for persistent spinal issues.  She has history of a noninstrumented thoracic fusion in 1965 for scoliosis.  She then had some cervical fracture in 1989 resulting in  surgeries of the cervical spine in 1990 and then again 2017 with a reported cervical fusion.  She primarily has lower back pain, including difficulty maintaining postures and central lower back pain exacerbated during sleep. She reports no radiating leg pain.  She also reports headaches but no significant neck pain, arm pain, numbness, or tingling. She is not on arthritis medication due to intolerance with NSAIDs.    Back pain is worse with sitting too long and increased activity it is mostly in the lower back again no radiating leg pain or numbness or tingling of the lower extremities.    She is scheduled for hip replacement in July 2025.                  6/3/2025    10:11 AM   AMB PAIN ASSESSMENT   Location of Pain Back   Location Modifiers Posterior   Severity of Pain 8   Quality of Pain Aching;Other (Comment)    Duration of Pain Persistent   Frequency of Pain Constant   Date Pain First Started 1/15/2024   Aggravating Factors Other (Comment);Standing    Limiting Behavior Some   Relieving Factors Other (Comment)    Result of Injury No   Work-Related Injury No   Are there other pain locations you wish to document? No       Significant value              ROS/Meds/PSH/PMH/FH/SH: I personally reviewed the patient's collected intake data.  Below are the pertinents:    Allergies   Allergen Reactions    Shellfish-Derived Products Other (See Comments), Anxiety, Hives, Itching, Nausea And Vomiting, Palpitations, Photosensitivity, Rash and Swelling     Other Reaction(s): Rash, urticarial    Other reaction(s):

## 2025-06-12 ENCOUNTER — HOSPITAL ENCOUNTER (OUTPATIENT)
Dept: PHYSICAL THERAPY | Age: 75
Setting detail: RECURRING SERIES
Discharge: HOME | End: 2025-06-15
Payer: MEDICARE

## 2025-06-12 DIAGNOSIS — M54.59 OTHER LOW BACK PAIN: Primary | ICD-10-CM

## 2025-06-12 DIAGNOSIS — M25.69 STIFFNESS OF OTHER SPECIFIED JOINT, NOT ELSEWHERE CLASSIFIED: ICD-10-CM

## 2025-06-12 DIAGNOSIS — M62.81 MUSCLE WEAKNESS (GENERALIZED): ICD-10-CM

## 2025-06-12 PROCEDURE — 97110 THERAPEUTIC EXERCISES: CPT

## 2025-06-12 PROCEDURE — 97161 PT EVAL LOW COMPLEX 20 MIN: CPT

## 2025-06-12 ASSESSMENT — PAIN SCALES - GENERAL: PAINLEVEL_OUTOF10: 5

## 2025-06-12 NOTE — PROGRESS NOTES
Brandy Grullon  : 1950  Primary: Medicare Part A And B (Medicare)  Secondary: MILY NINA [facility] [place] @ [address] [place] [address]  Phone: [phone]  Fax: [phone] Plan Frequency: 2x week for 2 weeks    Plan of Care/Certification Expiration Date: 25        Plan of Care/Certification Expiration Date:  Plan of Care/Certification Expiration Date: 25    Frequency/Duration: Plan Frequency: 2x week for 2 weeks      Time In/Out:   Time In: 1017  Time Out: 1100      PT Visit Info:         Visit Count:  1     OUTPATIENT PHYSICAL THERAPY:   Treatment Note 2025       Episode  (Low Back Pain)               Treatment Diagnosis:    Other low back pain  Stiffness of other specified joint, not elsewhere classified  Muscle weakness (generalized)  Medical/Referring Diagnosis:    Low back pain, unspecified back pain laterality, unspecified chronicity, unspecified whether sciati*  Facet arthropathy, lumbar  Degeneration of intervertebral disc of lumbar region with discogenic back pain  History of thoracic spinal fusion      Referring Physician:  Logan Lezama APRN - CNP  MD Orders:  PT Eval and Treat   Return MD Appt:  25   Date of Onset:  Onset Date: 25 (Pt uncertain of specific date but recalls increase pain about a month ago)    Allergies:   Shellfish-derived products, Clarithromycin, Diclofenac, Diclofenac sodium, Food, Gluten meal, Methocarbamol, Miconazole, Miconazole nitrate, Nsaids, Statins, Wheat, Barley grass, Ciprofloxacin, Erythromycin, Gluten, Oxycodone, and Wheat  Restrictions/Precautions:   None      Interventions Planned (Treatment may consist of any combination of the following):     See Assessment Note    Subjective Comments:   See eval.   Initial Pain Level:     5/10  Post Session Pain Level:      6/10  Medications Last Reviewed: 2025  Updated Objective Findings:  See Evaluation Note from today  Treatment

## 2025-06-12 NOTE — THERAPY EVALUATION
Brandy Grullon  : 1950  Primary: Medicare Part A And B (Medicare)  Secondary: MILY NINA [facility] [place]note 2[place] @ [address] [place] [address]  Phone: [phone]  Fax: [phone] Plan Frequency: 2x week for 2 weeks    Plan of Care/Certification Expiration Date: 25        Plan of Care/Certification Expiration Date:  Plan of Care/Certification Expiration Date: 25    Frequency/Duration: Plan Frequency: 2x week for 2 weeks      Time In/Out:   Time In: 1017  Time Out: 1100      PT Visit Info:         Visit Count:  1                 OUTPATIENT PHYSICAL THERAPY:             Initial Assessment 2025               Episode (Low Back Pain)         Treatment Diagnosis:    Other low back pain  Stiffness of other specified joint, not elsewhere classified  Muscle weakness (generalized)  Medical/Referring Diagnosis:    Low back pain, unspecified back pain laterality, unspecified chronicity, unspecified whether sciati*  Facet arthropathy, lumbar  Degeneration of intervertebral disc of lumbar region with discogenic back pain  History of thoracic spinal fusion      Referring Physician:  Logan Lezama APRN - CNP    MD Orders:  PT Eval and Treat   Return MD Appt:    Date of Onset:  Onset Date: 25 (Pt uncertain of specific date but recalls increase pain about a month ago)     Allergies:  Shellfish-derived products, Clarithromycin, Diclofenac, Diclofenac sodium, Food, Gluten meal, Methocarbamol, Miconazole, Miconazole nitrate, Nsaids, Statins, Wheat, Barley grass, Ciprofloxacin, Erythromycin, Gluten, Oxycodone, and Wheat  Restrictions/Precautions:    None      Medications Last Reviewed: 2025     SUBJECTIVE   History of Injury/Illness (Reason for Referral):  Pt states she has a history of several surgical fusions. She reports increased low back pain and stiffness recently. She also reports she is having a hip replacement on . Pt reports no

## 2025-06-16 ENCOUNTER — HOSPITAL ENCOUNTER (OUTPATIENT)
Dept: REHABILITATION | Age: 75
Discharge: HOME | End: 2025-06-19
Payer: MEDICARE

## 2025-06-16 ENCOUNTER — HOSPITAL ENCOUNTER (OUTPATIENT)
Dept: SURGERY | Age: 75
Discharge: HOME | End: 2025-06-19
Payer: MEDICARE

## 2025-06-16 ENCOUNTER — PREP FOR PROCEDURE (OUTPATIENT)
Dept: ORTHOPEDIC SURGERY | Age: 75
End: 2025-06-16

## 2025-06-16 VITALS
RESPIRATION RATE: 18 BRPM | HEART RATE: 91 BPM | OXYGEN SATURATION: 97 % | SYSTOLIC BLOOD PRESSURE: 144 MMHG | WEIGHT: 182 LBS | BODY MASS INDEX: 31.07 KG/M2 | TEMPERATURE: 97.3 F | HEIGHT: 64 IN | DIASTOLIC BLOOD PRESSURE: 82 MMHG

## 2025-06-16 DIAGNOSIS — Z01.818 PRE-OP EVALUATION: Primary | ICD-10-CM

## 2025-06-16 DIAGNOSIS — Z01.818 PRE-OP EVALUATION: ICD-10-CM

## 2025-06-16 LAB
ALBUMIN SERPL-MCNC: 3.4 G/DL (ref 3.2–4.6)
ALBUMIN/GLOB SERPL: 0.9 (ref 1–1.9)
ALP SERPL-CCNC: 82 U/L (ref 35–104)
ALT SERPL-CCNC: 20 U/L (ref 8–45)
ANION GAP SERPL CALC-SCNC: 11 MMOL/L (ref 7–16)
AST SERPL-CCNC: 23 U/L (ref 15–37)
BASOPHILS # BLD: 0.05 K/UL (ref 0–0.2)
BASOPHILS NFR BLD: 0.7 % (ref 0–2)
BILIRUB SERPL-MCNC: 0.4 MG/DL (ref 0–1.2)
BUN SERPL-MCNC: 19 MG/DL (ref 8–23)
CALCIUM SERPL-MCNC: 9.3 MG/DL (ref 8.8–10.2)
CHLORIDE SERPL-SCNC: 103 MMOL/L (ref 98–107)
CO2 SERPL-SCNC: 26 MMOL/L (ref 20–29)
CREAT SERPL-MCNC: 0.94 MG/DL (ref 0.6–1.1)
DIFFERENTIAL METHOD BLD: ABNORMAL
EKG ATRIAL RATE: 70 BPM
EKG DIAGNOSIS: NORMAL
EKG P AXIS: 66 DEGREES
EKG P-R INTERVAL: 192 MS
EKG Q-T INTERVAL: 394 MS
EKG QRS DURATION: 92 MS
EKG QTC CALCULATION (BAZETT): 425 MS
EKG R AXIS: 31 DEGREES
EKG T AXIS: 29 DEGREES
EKG VENTRICULAR RATE: 70 BPM
EOSINOPHIL # BLD: 0.32 K/UL (ref 0–0.8)
EOSINOPHIL NFR BLD: 4.4 % (ref 0.5–7.8)
ERYTHROCYTE [DISTWIDTH] IN BLOOD BY AUTOMATED COUNT: 14.7 % (ref 11.9–14.6)
EST. AVERAGE GLUCOSE BLD GHB EST-MCNC: 122 MG/DL
GLOBULIN SER CALC-MCNC: 3.9 G/DL (ref 2.3–3.5)
GLUCOSE SERPL-MCNC: 108 MG/DL (ref 70–99)
HBA1C MFR BLD: 5.9 % (ref 0–5.6)
HCT VFR BLD AUTO: 44 % (ref 35.8–46.3)
HGB BLD-MCNC: 13.9 G/DL (ref 11.7–15.4)
IMM GRANULOCYTES # BLD AUTO: 0.04 K/UL (ref 0–0.5)
IMM GRANULOCYTES NFR BLD AUTO: 0.5 % (ref 0–5)
INR PPP: 1
LYMPHOCYTES # BLD: 2.77 K/UL (ref 0.5–4.6)
LYMPHOCYTES NFR BLD: 37.8 % (ref 13–44)
MCH RBC QN AUTO: 29.2 PG (ref 26.1–32.9)
MCHC RBC AUTO-ENTMCNC: 31.6 G/DL (ref 31.4–35)
MCV RBC AUTO: 92.4 FL (ref 82–102)
MONOCYTES # BLD: 0.78 K/UL (ref 0.1–1.3)
MONOCYTES NFR BLD: 10.6 % (ref 4–12)
MRSA DNA SPEC QL NAA+PROBE: NOT DETECTED
NEUTS SEG # BLD: 3.37 K/UL (ref 1.7–8.2)
NEUTS SEG NFR BLD: 46 % (ref 43–78)
NRBC # BLD: 0 K/UL (ref 0–0.2)
PLATELET # BLD AUTO: 364 K/UL (ref 150–450)
PMV BLD AUTO: 9.3 FL (ref 9.4–12.3)
POTASSIUM SERPL-SCNC: 4.2 MMOL/L (ref 3.5–5.1)
PROT SERPL-MCNC: 7.2 G/DL (ref 6.3–8.2)
PROTHROMBIN TIME: 13.4 SEC (ref 11.3–14.9)
RBC # BLD AUTO: 4.76 M/UL (ref 4.05–5.2)
S AUREUS CPE NOSE QL NAA+PROBE: NOT DETECTED
SODIUM SERPL-SCNC: 140 MMOL/L (ref 136–145)
WBC # BLD AUTO: 7.3 K/UL (ref 4.3–11.1)

## 2025-06-16 PROCEDURE — 98960 EDU&TRN PT SELF-MGMT NQHP 1: CPT

## 2025-06-16 PROCEDURE — 97161 PT EVAL LOW COMPLEX 20 MIN: CPT

## 2025-06-16 PROCEDURE — 83036 HEMOGLOBIN GLYCOSYLATED A1C: CPT

## 2025-06-16 PROCEDURE — 85025 COMPLETE CBC W/AUTO DIFF WBC: CPT

## 2025-06-16 PROCEDURE — 85610 PROTHROMBIN TIME: CPT

## 2025-06-16 PROCEDURE — 94664 DEMO&/EVAL PT USE INHALER: CPT

## 2025-06-16 PROCEDURE — 80307 DRUG TEST PRSMV CHEM ANLYZR: CPT

## 2025-06-16 PROCEDURE — 87641 MR-STAPH DNA AMP PROBE: CPT

## 2025-06-16 PROCEDURE — 93010 ELECTROCARDIOGRAM REPORT: CPT | Performed by: INTERNAL MEDICINE

## 2025-06-16 PROCEDURE — 94760 N-INVAS EAR/PLS OXIMETRY 1: CPT

## 2025-06-16 PROCEDURE — 93005 ELECTROCARDIOGRAM TRACING: CPT

## 2025-06-16 PROCEDURE — 80053 COMPREHEN METABOLIC PANEL: CPT

## 2025-06-16 RX ORDER — MAGNESIUM GLYCINATE 100 MG
500 CAPSULE ORAL NIGHTLY
COMMUNITY

## 2025-06-16 RX ORDER — NYSTATIN 100000 [USP'U]/G
POWDER TOPICAL DAILY PRN
COMMUNITY

## 2025-06-16 RX ORDER — ACETAMINOPHEN 160 MG
2000 TABLET,DISINTEGRATING ORAL DAILY
COMMUNITY

## 2025-06-16 RX ORDER — SODIUM CHLORIDE 0.9 % (FLUSH) 0.9 %
5-40 SYRINGE (ML) INJECTION PRN
Status: CANCELLED | OUTPATIENT
Start: 2025-06-16

## 2025-06-16 RX ORDER — ACETAMINOPHEN/DIPHENHYDRAMINE 500MG-25MG
2 TABLET ORAL NIGHTLY PRN
COMMUNITY

## 2025-06-16 RX ORDER — THYROID 30 MG/1
30 TABLET ORAL
COMMUNITY

## 2025-06-16 RX ORDER — TRIAMCINOLONE ACETONIDE 5 MG/G
CREAM TOPICAL DAILY PRN
COMMUNITY

## 2025-06-16 RX ORDER — SODIUM CHLORIDE 0.9 % (FLUSH) 0.9 %
5-40 SYRINGE (ML) INJECTION EVERY 12 HOURS SCHEDULED
Status: CANCELLED | OUTPATIENT
Start: 2025-06-16

## 2025-06-16 RX ORDER — SODIUM CHLORIDE 9 MG/ML
INJECTION, SOLUTION INTRAVENOUS PRN
Status: CANCELLED | OUTPATIENT
Start: 2025-06-16

## 2025-06-16 RX ORDER — GINSENG 100 MG
50 CAPSULE ORAL DAILY
COMMUNITY

## 2025-06-16 RX ORDER — LORAZEPAM 1 MG/1
0.5 TABLET ORAL NIGHTLY PRN
COMMUNITY

## 2025-06-16 RX ORDER — NYSTATIN 100000 U/G
CREAM TOPICAL DAILY PRN
COMMUNITY

## 2025-06-16 ASSESSMENT — PAIN SCALES - GENERAL
PAINLEVEL_OUTOF10: 2
PAINLEVEL_OUTOF10: 8

## 2025-06-16 ASSESSMENT — PULMONARY FUNCTION TESTS
FEV1 (%PREDICTED): 64
FEV1 (LITERS): 1.5

## 2025-06-16 ASSESSMENT — PAIN DESCRIPTION - LOCATION
LOCATION: HIP
LOCATION: HIP

## 2025-06-16 ASSESSMENT — HOOS JR
WALKING ON UNEVEN SURFACE: MILD
LYING IN BED (TURNING OVER, MAINTAINING HIP POSITION): MODERATE
HOOS JR TOTAL INTERVAL SCORE: 52.965
HOOS JR RAW SCORE: 12
BENDING TO THE FLOOR TO PICK UP OBJECT: SEVERE
GOING UP OR DOWN STAIRS: MODERATE
HOOS JR RAW SCORE: 12
SITTING: MODERATE
RISING FROM SITTING: MODERATE

## 2025-06-16 ASSESSMENT — PAIN DESCRIPTION - ORIENTATION
ORIENTATION: RIGHT;ANTERIOR;POSTERIOR;INNER;OUTER
ORIENTATION: RIGHT

## 2025-06-16 ASSESSMENT — PAIN DESCRIPTION - DESCRIPTORS
DESCRIPTORS: ACHING;SHARP
DESCRIPTORS: ACHING

## 2025-06-16 NOTE — PERIOP NOTE
Second Call placed to the pacemaker rep Tony Ayala at [phone]. The following information received.   Is this patient pacemaker dependent? NO  How often is the patient paced? 24 % in the [facility] and < 1% in the ventricle   What will happen if a magnet is used? Resets to 100 BPM in the DOO   Does it reset the rate or change the Isamar Ayala rogram mode from DDD to VOO? DOO    Anesthesia to review the responses on this note and advise if a representative will be needed on the day of surgery.

## 2025-06-16 NOTE — PROGRESS NOTES
[] [] [] [] [] [] [] [] [] []     [] [] [] [] [] [] [] [] [] [] []    I=Independent, Mod I=Modified Independent, S=Supervision, SBA=Standby Assistance, CGA=Contact Guard Assistance,   Min=Minimal Assistance, Mod=Moderate Assistance, Max=Maximal Assistance, Total=Total Assistance, NT=Not Tested    BALANCE: Good Fair+ Fair Fair- Poor NT Comments   Sitting Static [x] [] [] [] [] []    Sitting Dynamic [x] [] [] [] [] []              Standing Static [] [x] [] [] [] []    Standing Dynamic [] [x] [] [] [] []      Postural Assessment:   Leg length discrepancy   R LE 1/8\" shorter than L    GAIT: I Mod I S SBA CGA Min Mod Max Total  NT x2 Comments:   Level of Assistance [x] [] [] [] [] [] [] [] [] [] []    Weightbearing Status       Distance  300 feet    Gait Quality Antalgic, Decreased cal , Decreased step length, and Decreased stance    DME None     Stairs      Ramp     I=Independent, Mod I=Modified Independent, S=Supervision, SBA=Standby Assistance, CGA=Contact Guard Assistance,   Min=Minimal Assistance, Mod=Moderate Assistance, Max=Maximal Assistance, Total=Total Assistance, NT=Not Tested    TREATMENT:   EVALUATION: LOW COMPLEXITY: (Untimed Charge)  The initial evaluation charge encompasses clinical chart review, objective assessment, interpretation of assessment, and skilled monitoring of the patient's response to treatment in order to develop a plan of care.     TREATMENT PLAN:   Effective Dates: 6/16/2025 TO 6/16/2025.    Treatment/Session Assessment:  Patient was instructed in PT- HEP to increase strength and ROM in LEs.  Answered all questions.  Frequency/Duration: Patient to continue to perform home exercise program at least twice per day up until her surgery.    EDUCATION: Education Given To: Patient, Family  Education Provided: Role of Therapy, Plan of Care, Home Exercise Program, Precautions  Education Method: Demonstration, Verbal, Teach Back, Printed Information/Hand-outs  Education Outcome: Verbalized

## 2025-06-16 NOTE — PERIOP NOTE
Patient verified name and .    Order for consent was found in EHR and does match case posting; patient verified.     Type 3 surgery, joint-camp assessment complete.    Labs per surgeon: CBC, CMP, A1C, PT/INR, Urine Nicotine ; results pending.   Labs per anesthesia protocol: no additional labs needed.   EKG: Completed today; result within anesthesia protocol.     Pacemaker check (, Stress test (3/13/25), Echo (23), [facility] note (3/7/25), and PCP note (24) available in EHR for reference.     Patient did not have Pacemaker ID card with her today. Pacemaker type added to case posting.     MRSA/MSSA swab collected per policy. MD to consult pharmacy to dose Vanc if appropriate.     Hospital approved surgical skin cleanser and instructions to return bottle on DOS given per hospital policy.    Patient provided with handouts including Guide to Surgery, Pain Management, Preventing Surgical Site Infections, and [facility] Brochure.    Patient answered medical/surgical history questions at their best of ability. All prior to admission medications documented in Epic. Original medication prescription bottles was visualized during patient appointment.     Patient instructed to hold all vitamins 3 weeks prior to surgery and NSAIDS 5 days prior to surgery.     Patient teach back successful and patient demonstrates knowledge of instruction.

## 2025-06-16 NOTE — PERIOP NOTE
During assessment the patient was found to have a pacemaker and a surgery that is either positioned prone or lateral and has an ICD or is ipsilateral, upper extremity, or a chest surgery.     Patient states NO to the question \"Are you pacer dependent?\"    Call placed to the pacemaker rep Tony Ayala at [phone]. No answer. Left message to return call to [phone] #7 for the following information.     Is this patient pacemaker dependent?   How often is the patient paced?   What will happen if a magnet is used?   Does it reset the rate or change the program mode from DDD to VOO?

## 2025-06-16 NOTE — PERIOP NOTE
CBC, CMP, PT/INR, A1C; results are within anesthesia guidelines, no follow-up required. Labs automatically routed to ordering provider via Epic documentation.    Urine Nicotine still in process. Charge nurse to follow up.

## 2025-06-16 NOTE — PROGRESS NOTES
Neck circumference:   35.5   cm    Loud snoring:                                                 YES             Witnessed apnea or wakening gasping or choking:        YES  Awakens with headaches:                                               DENIES  Morning or daytime tiredness/ sleepiness:                            TIRED  Dry mouth or sore throat in morning:            YES                                             Archer stage:  4                                   SACS score:11  Stop Bang                                PT HAS JR- USES DENTAL DEVICE     INITIAL RESPIRATORY ASSESSMENT   CS HS  Sat check Q SHIFT & prn  O2 PRN    ALBUTEROL  NEBULIZER Q6 PRN WHEEZING                                         Referrals:    Pt. Phone Number:

## 2025-06-16 NOTE — PERIOP NOTE
PLEASE CONTINUE TAKING ALL PRESCRIPTION MEDICATIONS UP TO THE DAY OF SURGERY UNLESS OTHERWISE DIRECTED BELOW. You may take Tylenol, allergy, and/or indigestion medications.     TAKE ONLY THESE MEDICATIONS ON THE DAY OF SURGERY ON 07/07/25      [place] Thyroid            DISCONTINUE all vitamins, herbals, and supplements 3 weeks prior to surgery. DISCONTINUE Non-Steroidal Anti-Inflammatory (NSAIDS) such as Advil, Ibuprofen, Motrin, Naproxen, and Aleve 5 days prior to surgery unless instructed to hold longer by surgeon.     Home Medications to Hold- please continue all other medications except these.            Comments     May stay on Probiotic and Magnesium      On the day before surgery (07/06/25) please take 2 Tylenol in the morning and then again before bed. You may use either regular or extra strength.      Bring: Photo ID, Insurance card, Fiordaliza-hex soap, Incentive Spirometer        Please do not bring home medications with you on the day of surgery unless otherwise directed by your nurse.  If you are instructed to bring home medications, please give them to your nurse as they will be administered by the nursing staff.    If you have any questions, please call [facility] [phone].    A copy of this note was provided to the patient for reference.

## 2025-06-17 LAB
COMMENT:: NORMAL
COTININE UR QL SCN: NEGATIVE NG/ML

## 2025-06-18 NOTE — PERIOP NOTE
NICOTINE AND METABOLITES, URINE  Order: [id number]   Status: Final result       Next appt: 06/19/2025 at 11:15 AM in Physical Therapy (Rehana Hendrickson, PT)       Dx: Pre-op evaluation    Test Result Released: Yes (seen)    0 Result Notes       Component  Ref Range & Units (hover) 6/16/25 0935 Resulting Agency   Cotinine Screen, Ur Negative [facility]   Comment: Comment [facility]   Comment: (NOTE)  This analysis is performed by immunoassay. Positive findings are  unconfirmed analytical test results; if results do not support  expected clinical finding, confirmation by an alternate methodology  is recommended. Patient metabolic variables, specific drug chemistry,  and specimen characteristics can affect test outcome.  Technical consultation is available at [email or web address], or  call toll free [phone].  Performed At: [facility]  [address] [place], [place] [id number]  Dick Pulido MD Ph:[phone]  Performed At: [facility]  [address] [place], [place] [id number]  Estrellita Choudhary PhD Ph:[phone]             Specimen Collected: 06/16/25 09:35 EDT Last Resulted: 06/17/25 16:36 EDT

## 2025-06-30 DIAGNOSIS — G89.18 POSTOPERATIVE PAIN: Primary | ICD-10-CM

## 2025-06-30 RX ORDER — ASPIRIN 81 MG/1
81 TABLET ORAL 2 TIMES DAILY
Qty: 60 TABLET | Refills: 0 | Status: SHIPPED | OUTPATIENT
Start: 2025-06-30 | End: 2025-07-01 | Stop reason: SINTOL

## 2025-06-30 RX ORDER — ACETAMINOPHEN 500 MG
1000 TABLET ORAL EVERY 6 HOURS PRN
Qty: 180 TABLET | Refills: 2 | Status: SHIPPED | OUTPATIENT
Start: 2025-06-30

## 2025-06-30 RX ORDER — OMEPRAZOLE 40 MG/1
40 CAPSULE, DELAYED RELEASE ORAL DAILY
Qty: 30 CAPSULE | Refills: 0 | Status: SHIPPED | OUTPATIENT
Start: 2025-06-30

## 2025-06-30 RX ORDER — MELOXICAM 15 MG/1
15 TABLET ORAL DAILY
Qty: 30 TABLET | Refills: 2 | Status: SHIPPED | OUTPATIENT
Start: 2025-06-30

## 2025-06-30 RX ORDER — OXYCODONE HYDROCHLORIDE 5 MG/1
10 TABLET ORAL EVERY 4 HOURS PRN
Qty: 60 TABLET | Refills: 0 | Status: SHIPPED | OUTPATIENT
Start: 2025-06-30 | End: 2025-07-01 | Stop reason: SINTOL

## 2025-06-30 RX ORDER — SENNOSIDES 8.6 MG/1
1 TABLET ORAL 2 TIMES DAILY
Qty: 30 TABLET | Refills: 2 | Status: SHIPPED | OUTPATIENT
Start: 2025-06-30

## 2025-06-30 RX ORDER — ONDANSETRON 4 MG/1
4 TABLET, FILM COATED ORAL 3 TIMES DAILY PRN
Qty: 30 TABLET | Refills: 1 | Status: SHIPPED | OUTPATIENT
Start: 2025-06-30

## 2025-07-01 ENCOUNTER — OFFICE VISIT (OUTPATIENT)
Dept: ORTHOPEDIC SURGERY | Age: 75
End: 2025-07-01

## 2025-07-01 DIAGNOSIS — M16.11 PRIMARY OSTEOARTHRITIS OF RIGHT HIP: Primary | ICD-10-CM

## 2025-07-01 RX ORDER — TRAMADOL HYDROCHLORIDE 50 MG/1
50 TABLET ORAL EVERY 4 HOURS
Qty: 42 TABLET | Refills: 0 | Status: SHIPPED | OUTPATIENT
Start: 2025-07-01 | End: 2025-07-08

## 2025-07-01 NOTE — PROGRESS NOTES
Patient ID:  Brandy Grullon  [id number]  74 y.o.  1950    Today: July 1, 2025       CC:  Right hip pain    HPI:   The patient has end stage arthritis of the right hip. The patient was evaluated and examined in the office prior to today and was found to have a history on physical exam consistent with intra-articular pathology of the right hip. Patient complains of anterior groin pain predominately. Pain occurs during activity. Patient has difficulty putting on socks/shoes and has notable pain when getting up from a chair and getting out of a car. Patient does not complain of significant lateral hip pain or radicular pain down the leg. There have been no changes to the patient's orthopedic condition since the last office visit    Past Medical History:  Past Medical History:   Diagnosis Date    Anxiety and depression     Arthritis 2024    BMI 31.0-31.9,adult     Bradycardia     Centrilobular emphysema (HCC)     pt unsure of this diagnosis    Coronary artery disease     based on coronary calcium scan imaging; followed by [facility]    Dyslipidemia     GERD (gastroesophageal reflux disease)     History of echocardiogram 01/04/2023    The left ventricular systolic function is normal (55-65%).  · Grade I (mild) left ventricular diastolic dysfunction present,  consistent with impaired relaxation.  · The right ventricular systolic function is normal.  · Device wires noted in right sided chambers.  · No significant valvular abnormalities.    HTN (hypertension)     OA (osteoarthritis)     JR (obstructive sleep apnea)     wears an oral device; c-pap caused sinus infections    Osteoporosis 2024    On meds    PAD (peripheral artery disease)     Prediabetes     A1c-5.9 on 6/16/25    PUD (peptic ulcer disease)     HX    S/P placement of cardiac pacemaker 2012    not dependent; [facility]    Scoliosis     Sick sinus syndrome (HCC)     Squamous cell carcinoma in situ     Thyroid disease 2012    Hypothyroidism

## 2025-07-03 ENCOUNTER — APPOINTMENT (OUTPATIENT)
Dept: PHYSICAL THERAPY | Age: 75
End: 2025-07-03
Payer: MEDICARE

## 2025-07-06 ENCOUNTER — ANESTHESIA EVENT (OUTPATIENT)
Dept: SURGERY | Age: 75
End: 2025-07-06
Payer: MEDICARE

## 2025-07-07 ENCOUNTER — ANESTHESIA (OUTPATIENT)
Dept: SURGERY | Age: 75
End: 2025-07-07
Payer: MEDICARE

## 2025-07-07 ENCOUNTER — APPOINTMENT (OUTPATIENT)
Dept: GENERAL RADIOLOGY | Age: 75
End: 2025-07-07
Attending: ORTHOPAEDIC SURGERY
Payer: MEDICARE

## 2025-07-07 ENCOUNTER — HOSPITAL ENCOUNTER (OUTPATIENT)
Age: 75
Discharge: HOME | End: 2025-07-08
Attending: ORTHOPAEDIC SURGERY | Admitting: ORTHOPAEDIC SURGERY
Payer: MEDICARE

## 2025-07-07 PROBLEM — M16.11 OSTEOARTHRITIS OF RIGHT HIP, UNSPECIFIED OSTEOARTHRITIS TYPE: Status: ACTIVE | Noted: 2025-07-07

## 2025-07-07 PROCEDURE — 97535 SELF CARE MNGMENT TRAINING: CPT

## 2025-07-07 PROCEDURE — 2500000003 HC RX 250 WO HCPCS: Performed by: NURSE PRACTITIONER

## 2025-07-07 PROCEDURE — 2720000010 HC SURG SUPPLY STERILE: Performed by: ORTHOPAEDIC SURGERY

## 2025-07-07 PROCEDURE — 72170 X-RAY EXAM OF PELVIS: CPT

## 2025-07-07 PROCEDURE — 6360000002 HC RX W HCPCS: Performed by: ANESTHESIOLOGY

## 2025-07-07 PROCEDURE — 6370000000 HC RX 637 (ALT 250 FOR IP): Performed by: ORTHOPAEDIC SURGERY

## 2025-07-07 PROCEDURE — 3700000001 HC ADD 15 MINUTES (ANESTHESIA): Performed by: ORTHOPAEDIC SURGERY

## 2025-07-07 PROCEDURE — 97162 PT EVAL MOD COMPLEX 30 MIN: CPT

## 2025-07-07 PROCEDURE — 3700000000 HC ANESTHESIA ATTENDED CARE: Performed by: ORTHOPAEDIC SURGERY

## 2025-07-07 PROCEDURE — 3600000015 HC SURGERY LEVEL 5 ADDTL 15MIN: Performed by: ORTHOPAEDIC SURGERY

## 2025-07-07 PROCEDURE — C1713 ANCHOR/SCREW BN/BN,TIS/BN: HCPCS | Performed by: ORTHOPAEDIC SURGERY

## 2025-07-07 PROCEDURE — 6360000002 HC RX W HCPCS: Performed by: NURSE PRACTITIONER

## 2025-07-07 PROCEDURE — 2580000003 HC RX 258: Performed by: NURSE ANESTHETIST, CERTIFIED REGISTERED

## 2025-07-07 PROCEDURE — 2500000003 HC RX 250 WO HCPCS: Performed by: NURSE ANESTHETIST, CERTIFIED REGISTERED

## 2025-07-07 PROCEDURE — 7100000001 HC PACU RECOVERY - ADDTL 15 MIN: Performed by: ORTHOPAEDIC SURGERY

## 2025-07-07 PROCEDURE — 2580000003 HC RX 258: Performed by: ANESTHESIOLOGY

## 2025-07-07 PROCEDURE — 94761 N-INVAS EAR/PLS OXIMETRY MLT: CPT

## 2025-07-07 PROCEDURE — 2709999900 HC NON-CHARGEABLE SUPPLY: Performed by: ORTHOPAEDIC SURGERY

## 2025-07-07 PROCEDURE — 6370000000 HC RX 637 (ALT 250 FOR IP): Performed by: NURSE PRACTITIONER

## 2025-07-07 PROCEDURE — 27130 TOTAL HIP ARTHROPLASTY: CPT | Performed by: ORTHOPAEDIC SURGERY

## 2025-07-07 PROCEDURE — 6360000002 HC RX W HCPCS: Performed by: ORTHOPAEDIC SURGERY

## 2025-07-07 PROCEDURE — 2500000003 HC RX 250 WO HCPCS: Performed by: ANESTHESIOLOGY

## 2025-07-07 PROCEDURE — 7100000000 HC PACU RECOVERY - FIRST 15 MIN: Performed by: ORTHOPAEDIC SURGERY

## 2025-07-07 PROCEDURE — 3600000005 HC SURGERY LEVEL 5 BASE: Performed by: ORTHOPAEDIC SURGERY

## 2025-07-07 PROCEDURE — C1776 JOINT DEVICE (IMPLANTABLE): HCPCS | Performed by: ORTHOPAEDIC SURGERY

## 2025-07-07 PROCEDURE — 6360000002 HC RX W HCPCS: Performed by: NURSE ANESTHETIST, CERTIFIED REGISTERED

## 2025-07-07 PROCEDURE — 97161 PT EVAL LOW COMPLEX 20 MIN: CPT

## 2025-07-07 PROCEDURE — 97530 THERAPEUTIC ACTIVITIES: CPT

## 2025-07-07 PROCEDURE — 97165 OT EVAL LOW COMPLEX 30 MIN: CPT

## 2025-07-07 PROCEDURE — 2500000003 HC RX 250 WO HCPCS: Performed by: STUDENT IN AN ORGANIZED HEALTH CARE EDUCATION/TRAINING PROGRAM

## 2025-07-07 DEVICE — PINNACLE CANCELLOUS BONE SCREW 6.5MM X 35MM
Type: IMPLANTABLE DEVICE | Site: HIP | Status: FUNCTIONAL
Brand: PINNACLE

## 2025-07-07 DEVICE — EMPHASYS ACETABULAR SHELL THREE-HOLE 50MM CEMENTLESS
Type: IMPLANTABLE DEVICE | Site: HIP | Status: FUNCTIONAL
Brand: EMPHASYS

## 2025-07-07 DEVICE — ACTIS DUOFIX HIP PROSTHESIS (FEMORAL STEM 12/14 TAPER CEMENTLESS SIZE 4 STD COLLAR)  CE
Type: IMPLANTABLE DEVICE | Site: HIP | Status: FUNCTIONAL
Brand: ACTIS

## 2025-07-07 DEVICE — HIP H2 TOT ADV OTHER HD IMPL CAPPED SYNTHES: Type: IMPLANTABLE DEVICE | Site: HIP | Status: FUNCTIONAL

## 2025-07-07 DEVICE — EMPHASYS POLYETHYLENE LINER AOX ELEVATED RIM 50MM 36MM
Type: IMPLANTABLE DEVICE | Site: HIP | Status: FUNCTIONAL
Brand: EMPHASYS

## 2025-07-07 DEVICE — BIOLOX DELTA CERAMIC FEMORAL HEAD +5.0 36MM DIA 12/14 TAPER
Type: IMPLANTABLE DEVICE | Site: HIP | Status: FUNCTIONAL
Brand: BIOLOX DELTA

## 2025-07-07 RX ORDER — VANCOMYCIN HYDROCHLORIDE 1 G/20ML
INJECTION, POWDER, LYOPHILIZED, FOR SOLUTION INTRAVENOUS PRN
Status: DISCONTINUED | OUTPATIENT
Start: 2025-07-07 | End: 2025-07-07 | Stop reason: ALTCHOICE

## 2025-07-07 RX ORDER — SODIUM CHLORIDE 0.9 % (FLUSH) 0.9 %
5-40 SYRINGE (ML) INJECTION EVERY 12 HOURS SCHEDULED
Status: DISCONTINUED | OUTPATIENT
Start: 2025-07-07 | End: 2025-07-08 | Stop reason: HOSPADM

## 2025-07-07 RX ORDER — SODIUM CHLORIDE 9 MG/ML
INJECTION, SOLUTION INTRAVENOUS PRN
Status: DISCONTINUED | OUTPATIENT
Start: 2025-07-07 | End: 2025-07-07 | Stop reason: HOSPADM

## 2025-07-07 RX ORDER — LIDOCAINE HYDROCHLORIDE 10 MG/ML
1 INJECTION, SOLUTION INFILTRATION; PERINEURAL
Status: DISCONTINUED | OUTPATIENT
Start: 2025-07-07 | End: 2025-07-07 | Stop reason: HOSPADM

## 2025-07-07 RX ORDER — ONDANSETRON 4 MG/1
4 TABLET, ORALLY DISINTEGRATING ORAL EVERY 8 HOURS PRN
Status: DISCONTINUED | OUTPATIENT
Start: 2025-07-07 | End: 2025-07-08 | Stop reason: HOSPADM

## 2025-07-07 RX ORDER — SODIUM CHLORIDE 9 MG/ML
INJECTION, SOLUTION INTRAVENOUS CONTINUOUS
Status: DISCONTINUED | OUTPATIENT
Start: 2025-07-07 | End: 2025-07-08 | Stop reason: HOSPADM

## 2025-07-07 RX ORDER — OXYCODONE HYDROCHLORIDE 5 MG/1
10 TABLET ORAL EVERY 4 HOURS PRN
Status: DISCONTINUED | OUTPATIENT
Start: 2025-07-07 | End: 2025-07-08 | Stop reason: HOSPADM

## 2025-07-07 RX ORDER — MIDAZOLAM HYDROCHLORIDE 2 MG/2ML
2 INJECTION, SOLUTION INTRAMUSCULAR; INTRAVENOUS
Status: DISCONTINUED | OUTPATIENT
Start: 2025-07-07 | End: 2025-07-07 | Stop reason: HOSPADM

## 2025-07-07 RX ORDER — SODIUM CHLORIDE 0.9 % (FLUSH) 0.9 %
5-40 SYRINGE (ML) INJECTION EVERY 12 HOURS SCHEDULED
Status: DISCONTINUED | OUTPATIENT
Start: 2025-07-07 | End: 2025-07-07 | Stop reason: HOSPADM

## 2025-07-07 RX ORDER — TRANEXAMIC ACID 650 MG/1
1300 TABLET ORAL
Status: COMPLETED | OUTPATIENT
Start: 2025-07-07 | End: 2025-07-07

## 2025-07-07 RX ORDER — ROPIVACAINE HYDROCHLORIDE 2 MG/ML
INJECTION, SOLUTION EPIDURAL; INFILTRATION; PERINEURAL PRN
Status: DISCONTINUED | OUTPATIENT
Start: 2025-07-07 | End: 2025-07-07 | Stop reason: ALTCHOICE

## 2025-07-07 RX ORDER — DIPHENHYDRAMINE HYDROCHLORIDE 50 MG/ML
25 INJECTION, SOLUTION INTRAMUSCULAR; INTRAVENOUS EVERY 6 HOURS PRN
Status: DISCONTINUED | OUTPATIENT
Start: 2025-07-07 | End: 2025-07-08 | Stop reason: HOSPADM

## 2025-07-07 RX ORDER — ONDANSETRON 2 MG/ML
4 INJECTION INTRAMUSCULAR; INTRAVENOUS EVERY 6 HOURS PRN
Status: DISCONTINUED | OUTPATIENT
Start: 2025-07-07 | End: 2025-07-08 | Stop reason: HOSPADM

## 2025-07-07 RX ORDER — PROCHLORPERAZINE EDISYLATE 5 MG/ML
5 INJECTION INTRAMUSCULAR; INTRAVENOUS
Status: DISCONTINUED | OUTPATIENT
Start: 2025-07-07 | End: 2025-07-07 | Stop reason: HOSPADM

## 2025-07-07 RX ORDER — SENNA AND DOCUSATE SODIUM 50; 8.6 MG/1; MG/1
1 TABLET, FILM COATED ORAL 2 TIMES DAILY
Status: DISCONTINUED | OUTPATIENT
Start: 2025-07-07 | End: 2025-07-08 | Stop reason: HOSPADM

## 2025-07-07 RX ORDER — ASPIRIN 81 MG/1
81 TABLET ORAL 2 TIMES DAILY
Status: COMPLETED | OUTPATIENT
Start: 2025-07-07 | End: 2025-07-07

## 2025-07-07 RX ORDER — PANTOPRAZOLE SODIUM 40 MG/1
40 TABLET, DELAYED RELEASE ORAL
Status: DISCONTINUED | OUTPATIENT
Start: 2025-07-08 | End: 2025-07-08 | Stop reason: HOSPADM

## 2025-07-07 RX ORDER — PROPOFOL 10 MG/ML
INJECTION, EMULSION INTRAVENOUS
Status: DISCONTINUED | OUTPATIENT
Start: 2025-07-07 | End: 2025-07-07 | Stop reason: SDUPTHER

## 2025-07-07 RX ORDER — KETAMINE HCL IN NACL, ISO-OSM 20 MG/2 ML
10 SYRINGE (ML) INJECTION ONCE
Status: COMPLETED | OUTPATIENT
Start: 2025-07-07 | End: 2025-07-07

## 2025-07-07 RX ORDER — TRANEXAMIC ACID 100 MG/ML
INJECTION, SOLUTION INTRAVENOUS
Status: DISCONTINUED | OUTPATIENT
Start: 2025-07-07 | End: 2025-07-07 | Stop reason: SDUPTHER

## 2025-07-07 RX ORDER — SODIUM CHLORIDE 0.9 % (FLUSH) 0.9 %
5-40 SYRINGE (ML) INJECTION PRN
Status: DISCONTINUED | OUTPATIENT
Start: 2025-07-07 | End: 2025-07-07 | Stop reason: HOSPADM

## 2025-07-07 RX ORDER — ACETAMINOPHEN 500 MG
1000 TABLET ORAL ONCE
Status: DISCONTINUED | OUTPATIENT
Start: 2025-07-07 | End: 2025-07-07 | Stop reason: HOSPADM

## 2025-07-07 RX ORDER — MIDAZOLAM HYDROCHLORIDE 1 MG/ML
INJECTION, SOLUTION INTRAMUSCULAR; INTRAVENOUS
Status: DISCONTINUED | OUTPATIENT
Start: 2025-07-07 | End: 2025-07-07 | Stop reason: SDUPTHER

## 2025-07-07 RX ORDER — LIDOCAINE HYDROCHLORIDE 20 MG/ML
INJECTION, SOLUTION EPIDURAL; INFILTRATION; INTRACAUDAL; PERINEURAL
Status: DISCONTINUED | OUTPATIENT
Start: 2025-07-07 | End: 2025-07-07 | Stop reason: SDUPTHER

## 2025-07-07 RX ORDER — TOBRAMYCIN 1.2 G/30ML
INJECTION, POWDER, LYOPHILIZED, FOR SOLUTION INTRAVENOUS PRN
Status: DISCONTINUED | OUTPATIENT
Start: 2025-07-07 | End: 2025-07-07 | Stop reason: ALTCHOICE

## 2025-07-07 RX ORDER — OXYCODONE HYDROCHLORIDE 5 MG/1
5 TABLET ORAL EVERY 4 HOURS PRN
COMMUNITY

## 2025-07-07 RX ORDER — OXYCODONE HYDROCHLORIDE 5 MG/1
5 TABLET ORAL EVERY 4 HOURS PRN
Status: DISCONTINUED | OUTPATIENT
Start: 2025-07-07 | End: 2025-07-08 | Stop reason: HOSPADM

## 2025-07-07 RX ORDER — SODIUM CHLORIDE 0.9 % (FLUSH) 0.9 %
5-40 SYRINGE (ML) INJECTION PRN
Status: DISCONTINUED | OUTPATIENT
Start: 2025-07-07 | End: 2025-07-08 | Stop reason: HOSPADM

## 2025-07-07 RX ORDER — HYDROMORPHONE HYDROCHLORIDE 1 MG/ML
1 INJECTION, SOLUTION INTRAMUSCULAR; INTRAVENOUS; SUBCUTANEOUS
Status: DISCONTINUED | OUTPATIENT
Start: 2025-07-07 | End: 2025-07-08 | Stop reason: HOSPADM

## 2025-07-07 RX ORDER — SODIUM CHLORIDE 9 MG/ML
INJECTION, SOLUTION INTRAVENOUS PRN
Status: DISCONTINUED | OUTPATIENT
Start: 2025-07-07 | End: 2025-07-08 | Stop reason: HOSPADM

## 2025-07-07 RX ORDER — EPHEDRINE SULFATE 5 MG/ML
INJECTION INTRAVENOUS
Status: DISCONTINUED | OUTPATIENT
Start: 2025-07-07 | End: 2025-07-07 | Stop reason: SDUPTHER

## 2025-07-07 RX ORDER — KETOROLAC TROMETHAMINE 30 MG/ML
INJECTION, SOLUTION INTRAMUSCULAR; INTRAVENOUS PRN
Status: DISCONTINUED | OUTPATIENT
Start: 2025-07-07 | End: 2025-07-07 | Stop reason: ALTCHOICE

## 2025-07-07 RX ORDER — THYROID 60 MG/1
30 TABLET ORAL
Status: DISCONTINUED | OUTPATIENT
Start: 2025-07-08 | End: 2025-07-08 | Stop reason: HOSPADM

## 2025-07-07 RX ORDER — SODIUM CHLORIDE, SODIUM LACTATE, POTASSIUM CHLORIDE, CALCIUM CHLORIDE 600; 310; 30; 20 MG/100ML; MG/100ML; MG/100ML; MG/100ML
INJECTION, SOLUTION INTRAVENOUS CONTINUOUS
Status: DISCONTINUED | OUTPATIENT
Start: 2025-07-07 | End: 2025-07-07 | Stop reason: HOSPADM

## 2025-07-07 RX ORDER — ONDANSETRON 2 MG/ML
4 INJECTION INTRAMUSCULAR; INTRAVENOUS
Status: COMPLETED | OUTPATIENT
Start: 2025-07-07 | End: 2025-07-07

## 2025-07-07 RX ORDER — ACETAMINOPHEN 500 MG
1000 TABLET ORAL EVERY 6 HOURS
Status: DISCONTINUED | OUTPATIENT
Start: 2025-07-07 | End: 2025-07-08 | Stop reason: HOSPADM

## 2025-07-07 RX ORDER — LORAZEPAM 0.5 MG/1
0.5 TABLET ORAL NIGHTLY PRN
Status: DISCONTINUED | OUTPATIENT
Start: 2025-07-07 | End: 2025-07-08 | Stop reason: HOSPADM

## 2025-07-07 RX ORDER — ONDANSETRON 4 MG/1
8 TABLET, ORALLY DISINTEGRATING ORAL EVERY 8 HOURS PRN
Status: DISCONTINUED | OUTPATIENT
Start: 2025-07-07 | End: 2025-07-07

## 2025-07-07 RX ORDER — DIPHENHYDRAMINE HCL 25 MG
25 CAPSULE ORAL EVERY 6 HOURS PRN
Status: DISCONTINUED | OUTPATIENT
Start: 2025-07-07 | End: 2025-07-08 | Stop reason: HOSPADM

## 2025-07-07 RX ORDER — NALOXONE HYDROCHLORIDE 0.4 MG/ML
0.4 INJECTION, SOLUTION INTRAMUSCULAR; INTRAVENOUS; SUBCUTANEOUS PRN
Status: DISCONTINUED | OUTPATIENT
Start: 2025-07-07 | End: 2025-07-08 | Stop reason: HOSPADM

## 2025-07-07 RX ADMIN — Medication 10 MG: at 14:48

## 2025-07-07 RX ADMIN — HYDROMORPHONE HYDROCHLORIDE 0.5 MG: 1 INJECTION, SOLUTION INTRAMUSCULAR; INTRAVENOUS; SUBCUTANEOUS at 14:04

## 2025-07-07 RX ADMIN — PROPOFOL 50 MG: 10 INJECTION, EMULSION INTRAVENOUS at 12:34

## 2025-07-07 RX ADMIN — MIDAZOLAM 2 MG: 1 INJECTION INTRAMUSCULAR; INTRAVENOUS at 12:25

## 2025-07-07 RX ADMIN — MEPIVACAINE HYDROCHLORIDE 60 MG: 20 INJECTION, SOLUTION EPIDURAL; INFILTRATION at 12:28

## 2025-07-07 RX ADMIN — CEFAZOLIN 2000 MG: 10 INJECTION, POWDER, FOR SOLUTION INTRAVENOUS at 20:38

## 2025-07-07 RX ADMIN — ONDANSETRON 4 MG: 2 INJECTION, SOLUTION INTRAMUSCULAR; INTRAVENOUS at 14:38

## 2025-07-07 RX ADMIN — Medication 3 AMPULE: at 12:08

## 2025-07-07 RX ADMIN — TRANEXAMIC ACID 1300 MG: 650 TABLET ORAL at 20:38

## 2025-07-07 RX ADMIN — TRANEXAMIC ACID 1000 MG: 100 INJECTION INTRAVENOUS at 13:48

## 2025-07-07 RX ADMIN — SODIUM CHLORIDE, SODIUM LACTATE, POTASSIUM CHLORIDE, AND CALCIUM CHLORIDE 75 ML/HR: .6; .31; .03; .02 INJECTION, SOLUTION INTRAVENOUS at 12:08

## 2025-07-07 RX ADMIN — SODIUM CHLORIDE, PRESERVATIVE FREE 10 ML: 5 INJECTION INTRAVENOUS at 20:42

## 2025-07-07 RX ADMIN — PHENYLEPHRINE HYDROCHLORIDE 100 MCG: 0.1 INJECTION, SOLUTION INTRAVENOUS at 12:47

## 2025-07-07 RX ADMIN — LIDOCAINE HYDROCHLORIDE 60 MG: 20 INJECTION, SOLUTION EPIDURAL; INFILTRATION; INTRACAUDAL; PERINEURAL at 12:34

## 2025-07-07 RX ADMIN — TRANEXAMIC ACID 1300 MG: 650 TABLET ORAL at 18:21

## 2025-07-07 RX ADMIN — Medication 10 MG: at 14:31

## 2025-07-07 RX ADMIN — DOCUSATE SODIUM 50MG AND SENNOSIDES 8.6MG 1 TABLET: 8.6; 5 TABLET, FILM COATED ORAL at 20:38

## 2025-07-07 RX ADMIN — PHENYLEPHRINE HYDROCHLORIDE 100 MCG: 0.1 INJECTION, SOLUTION INTRAVENOUS at 13:01

## 2025-07-07 RX ADMIN — ONDANSETRON 4 MG: 4 TABLET, ORALLY DISINTEGRATING ORAL at 20:48

## 2025-07-07 RX ADMIN — Medication 2000 MG: at 12:39

## 2025-07-07 RX ADMIN — PHENYLEPHRINE HYDROCHLORIDE 30 MCG/MIN: 10 INJECTION INTRAVENOUS at 13:09

## 2025-07-07 RX ADMIN — ACETAMINOPHEN 1000 MG: 500 TABLET, FILM COATED ORAL at 18:21

## 2025-07-07 RX ADMIN — ASPIRIN 81 MG: 81 TABLET, COATED ORAL at 20:38

## 2025-07-07 RX ADMIN — HYDROMORPHONE HYDROCHLORIDE 0.5 MG: 1 INJECTION, SOLUTION INTRAMUSCULAR; INTRAVENOUS; SUBCUTANEOUS at 14:15

## 2025-07-07 RX ADMIN — EPHEDRINE SULFATE 10 MG: 5 INJECTION INTRAVENOUS at 12:56

## 2025-07-07 RX ADMIN — HYDROMORPHONE HYDROCHLORIDE 0.5 MG: 1 INJECTION, SOLUTION INTRAMUSCULAR; INTRAVENOUS; SUBCUTANEOUS at 14:10

## 2025-07-07 RX ADMIN — TRANEXAMIC ACID 1000 MG: 100 INJECTION INTRAVENOUS at 12:39

## 2025-07-07 RX ADMIN — OXYCODONE 10 MG: 5 TABLET ORAL at 20:40

## 2025-07-07 RX ADMIN — PROPOFOL 100 MCG/KG/MIN: 10 INJECTION, EMULSION INTRAVENOUS at 12:33

## 2025-07-07 RX ADMIN — HYDROMORPHONE HYDROCHLORIDE 0.5 MG: 1 INJECTION, SOLUTION INTRAMUSCULAR; INTRAVENOUS; SUBCUTANEOUS at 14:20

## 2025-07-07 ASSESSMENT — PAIN DESCRIPTION - LOCATION
LOCATION: HIP

## 2025-07-07 ASSESSMENT — PAIN DESCRIPTION - ORIENTATION
ORIENTATION: RIGHT

## 2025-07-07 ASSESSMENT — PAIN SCALES - GENERAL
PAINLEVEL_OUTOF10: 5
PAINLEVEL_OUTOF10: 5
PAINLEVEL_OUTOF10: 8
PAINLEVEL_OUTOF10: 5
PAINLEVEL_OUTOF10: 10
PAINLEVEL_OUTOF10: 8
PAINLEVEL_OUTOF10: 5
PAINLEVEL_OUTOF10: 8
PAINLEVEL_OUTOF10: 1
PAINLEVEL_OUTOF10: 7
PAINLEVEL_OUTOF10: 10
PAINLEVEL_OUTOF10: 8
PAINLEVEL_OUTOF10: 8
PAINLEVEL_OUTOF10: 10
PAINLEVEL_OUTOF10: 10
PAINLEVEL_OUTOF10: 7
PAINLEVEL_OUTOF10: 5
PAINLEVEL_OUTOF10: 4

## 2025-07-07 ASSESSMENT — COPD QUESTIONNAIRES: CAT_SEVERITY: MILD

## 2025-07-07 ASSESSMENT — PAIN - FUNCTIONAL ASSESSMENT
PAIN_FUNCTIONAL_ASSESSMENT: PREVENTS OR INTERFERES SOME ACTIVE ACTIVITIES AND ADLS
PAIN_FUNCTIONAL_ASSESSMENT: 0-10

## 2025-07-07 ASSESSMENT — PAIN DESCRIPTION - DESCRIPTORS
DESCRIPTORS: ACHING
DESCRIPTORS: ACHING
DESCRIPTORS: STABBING;SHARP
DESCRIPTORS: ACHING;SORE

## 2025-07-07 NOTE — ANESTHESIA PRE PROCEDURE
Nausea And Vomiting and Other (See Comments)     Other Reaction(s): Abdominal pain, GI Bleed-Intolerance, GI intolerance, Not available, Other (see comments)    \"like a fire ball>    Upset stomach      Other reaction(s): GI Bleed-Intolerance    E-Mycin    Fire ball in stomach    Other Reaction(s): Abdominal pain      Upset stomach  Other reaction(s): GI Bleed-Intolerance      \"like a fire ball>      Fire ball in stomach   • Gluten Other (See Comments), Dermatitis, Hives, Itching and Rash   • Oxycodone Nausea And Vomiting     Other Reaction(s): hallucinations   • Wheat Dermatitis, Itching and Rash       Problem List:    Patient Active Problem List   Diagnosis Code   • Primary osteoarthritis of right hip M16.11   • Osteoarthritis of right hip, unspecified osteoarthritis type M16.11       Past Medical History:        Diagnosis Date   • Anxiety and depression    • Arthritis 2024   • BMI 31.0-31.9,adult    • Bradycardia    • Centrilobular emphysema (HCC)     pt unsure of this diagnosis   • Coronary artery disease     based on coronary calcium scan imaging; followed by [facility]   • Dyslipidemia    • GERD (gastroesophageal reflux disease)    • History of echocardiogram 01/04/2023    The left ventricular systolic function is normal (55-65%).  · Grade I (mild) left ventricular diastolic dysfunction present,  consistent with impaired relaxation.  · The right ventricular systolic function is normal.  · Device wires noted in right sided chambers.  · No significant valvular abnormalities.   • HTN (hypertension)    • OA (osteoarthritis)    • JR (obstructive sleep apnea)     wears an oral device; c-pap caused sinus infections   • Osteoporosis 2024    On meds   • PAD (peripheral artery disease)    • Prediabetes     A1c-5.9 on 6/16/25   • PUD (peptic ulcer disease)     HX   • S/P placement of cardiac pacemaker 2012    not dependent; [facility]   • Scoliosis    • Sick sinus syndrome (HCC)    • Squamous cell carcinoma

## 2025-07-07 NOTE — CARE COORDINATION
CM note:    Chart reviewed for updates. CM met with pt at bedside, introduced role, confirmed demographics and discussed d/c planning. Patient is a 74 y.o. year old female admitted for Left RAMESH . Patient plans to return home on discharge. Order received to arrange home health. [facility] list given.  Referral sent to the  intake office.  Patient requesting we arrange a walker. Pt without preference towards provider. Referral sent to [facility]. Equipment delivered to the hospital room prior to discharge. Pt lives with sig other in a house. She is insured and has a PCP that she sees as needed. Pt is scheduled to discharge tomorrow. SMITH given. No further CM needs.     07/07/25 8633   Service Assessment   Patient Orientation Alert and Oriented   Cognition Alert   History Provided By Patient   Primary Caregiver Self   Accompanied By/Relationship N/S   Support Systems Spouse/Significant Other   Patient's Healthcare Decision Maker is: Legal Next of Kin  (Grand daughter)   PCP Verified by CM Yes   Last Visit to PCP Within last 3 months   Prior Functional Level Independent in ADLs/IADLs   Current Functional Level Independent in ADLs/IADLs   Financial Resources Medicare;Other (Comment)  ([employer])   Community Resources None   Social/Functional History   Home Equipment Rollator   Prior Level of Assist for ADLs Independent   Prior Level of Assist for Transfers Independent   Occupation Retired   Discharge Planning   Type of Residence House   Living Arrangements Spouse/Significant Other   Current Services Prior To Admission Durable Medical Equipment   Current DME Prior to Arrival   (Rollator)   Potential Assistance Needed Home Care;Durable Medical Equipment   Potential DME Needed Walker   DME Ordered? Walker   Type of Home Care Services PT   Patient expects to be discharged to: House   Services At/After Discharge   Transition of Care Consult (CM Consult)

## 2025-07-07 NOTE — ANESTHESIA PROCEDURE NOTES
Spinal Block    Patient location during procedure: OR  End time: 7/7/2025 12:32 PM  Reason for block: primary anesthetic  Staffing  Performed: anesthesiologist   Anesthesiologist: JADEN Ordonez MD  Performed by: JADEN Ordonez MD  Authorized by: JADEN Ordonez MD    Spinal Block  Patient position: sitting  Prep: ChloraPrep and site prepped and draped  Patient monitoring: cardiac monitor, continuous pulse ox, frequent blood pressure checks and oxygen  Approach: midline  Location: L4/L5  Provider prep: mask and sterile gloves  Local infiltration: lidocaine  Needle  Needle type: Mk   Needle gauge: 25 G  Assessment  Swirl obtained: Yes  CSF: clear  Attempts: 1  Hemodynamics: stable  Preanesthetic Checklist  Completed: patient identified, IV checked, site marked, risks and benefits discussed, surgical/procedural consents, equipment checked, pre-op evaluation, timeout performed, anesthesia consent given, oxygen available and monitors applied/VS acknowledged

## 2025-07-07 NOTE — OP NOTE
toradol, and morphine was injected about the surgical site with care being taken not to inject in the vicinity of neurovascular structures.    Prior to capsular closure one gram of vancomycin powder and 1.2 grams of tobramycin powder was placed within the capsular layer. Prior to wound closure one additional gram of TXA was given for a total of 2 grams. The capsule was repaired with a three #2 Fiberwires secured through drill holes placed in the posterior aspect of the trochanter. No drain was placed. The fascia was closed with a  #0 Bidirectional Stratafix barbed suture. The sub-Q layer was closed with 2-0 moncril suture. A 3-0 moncril stratafix suture in a running subcuticular fashion was used to close the skin. The incision site was thoroughly cleaned with alcohol and a Aquacel Drapes were then broken down and patient was transferred carefully back to the OR stretcher.  The sponge and needle counts were correct.  The patient tolerated the procedure without difficulty and left the operating room in satisfactory condition.    Signed By: Anup Santacruz MD

## 2025-07-07 NOTE — H&P
Patient ID:  Brandy Grullon  [id number]  74 y.o.  1950    Today: July 7, 2025       CC:  Right hip pain    HPI:   The patient has end stage arthritis of the right hip. The patient was evaluated and examined in the office prior to today and was found to have a history on physical exam consistent with intra-articular pathology of the right hip. Patient complains of anterior groin pain predominately. Pain occurs during activity. Patient has difficulty putting on socks/shoes and has notable pain when getting up from a chair and getting out of a car. Patient does not complain of significant lateral hip pain or radicular pain down the leg. There have been no changes to the patient's orthopedic condition since the last office visit    Past Medical History:  Past Medical History:   Diagnosis Date    Anxiety and depression     Arthritis 2024    BMI 31.0-31.9,adult     Bradycardia     Centrilobular emphysema (HCC)     pt unsure of this diagnosis    Coronary artery disease     based on coronary calcium scan imaging; followed by [facility]    Dyslipidemia     GERD (gastroesophageal reflux disease)     History of echocardiogram 01/04/2023    The left ventricular systolic function is normal (55-65%).  · Grade I (mild) left ventricular diastolic dysfunction present,  consistent with impaired relaxation.  · The right ventricular systolic function is normal.  · Device wires noted in right sided chambers.  · No significant valvular abnormalities.    HTN (hypertension)     OA (osteoarthritis)     JR (obstructive sleep apnea)     wears an oral device; c-pap caused sinus infections    Osteoporosis 2024    On meds    PAD (peripheral artery disease)     Prediabetes     A1c-5.9 on 6/16/25    PUD (peptic ulcer disease)     HX    S/P placement of cardiac pacemaker 2012    not dependent; [facility]    Scoliosis     Sick sinus syndrome (HCC)     Squamous cell carcinoma in situ     Thyroid disease 2012    Hypothyroidism

## 2025-07-08 VITALS
TEMPERATURE: 97.7 F | RESPIRATION RATE: 16 BRPM | OXYGEN SATURATION: 99 % | SYSTOLIC BLOOD PRESSURE: 116 MMHG | WEIGHT: 184.3 LBS | DIASTOLIC BLOOD PRESSURE: 57 MMHG | HEIGHT: 64 IN | HEART RATE: 70 BPM | BODY MASS INDEX: 31.47 KG/M2

## 2025-07-08 LAB
HCT VFR BLD AUTO: 39.6 % (ref 35.8–46.3)
HGB BLD-MCNC: 12.7 G/DL (ref 11.7–15.4)

## 2025-07-08 PROCEDURE — 2500000003 HC RX 250 WO HCPCS: Performed by: NURSE PRACTITIONER

## 2025-07-08 PROCEDURE — 97535 SELF CARE MNGMENT TRAINING: CPT

## 2025-07-08 PROCEDURE — 85014 HEMATOCRIT: CPT

## 2025-07-08 PROCEDURE — 97530 THERAPEUTIC ACTIVITIES: CPT

## 2025-07-08 PROCEDURE — 6370000000 HC RX 637 (ALT 250 FOR IP): Performed by: NURSE PRACTITIONER

## 2025-07-08 PROCEDURE — 6370000000 HC RX 637 (ALT 250 FOR IP): Performed by: ORTHOPAEDIC SURGERY

## 2025-07-08 PROCEDURE — 85018 HEMOGLOBIN: CPT

## 2025-07-08 PROCEDURE — 6360000002 HC RX W HCPCS: Performed by: NURSE PRACTITIONER

## 2025-07-08 PROCEDURE — 36415 COLL VENOUS BLD VENIPUNCTURE: CPT

## 2025-07-08 RX ORDER — ASPIRIN 81 MG/1
81 TABLET ORAL 2 TIMES DAILY
Status: COMPLETED | OUTPATIENT
Start: 2025-07-08 | End: 2025-07-08

## 2025-07-08 RX ORDER — ASPIRIN 81 MG/1
81 TABLET ORAL 2 TIMES DAILY
Qty: 30 TABLET | Refills: 3 | Status: SHIPPED | OUTPATIENT
Start: 2025-07-08 | End: 2025-07-09

## 2025-07-08 RX ADMIN — DOCUSATE SODIUM 50MG AND SENNOSIDES 8.6MG 1 TABLET: 8.6; 5 TABLET, FILM COATED ORAL at 09:35

## 2025-07-08 RX ADMIN — CEFAZOLIN 2000 MG: 10 INJECTION, POWDER, FOR SOLUTION INTRAVENOUS at 06:18

## 2025-07-08 RX ADMIN — ASPIRIN 81 MG: 81 TABLET, COATED ORAL at 09:35

## 2025-07-08 RX ADMIN — SODIUM CHLORIDE, PRESERVATIVE FREE 10 ML: 5 INJECTION INTRAVENOUS at 09:36

## 2025-07-08 RX ADMIN — ACETAMINOPHEN 1000 MG: 500 TABLET, FILM COATED ORAL at 00:03

## 2025-07-08 RX ADMIN — PANTOPRAZOLE SODIUM 40 MG: 40 TABLET, DELAYED RELEASE ORAL at 06:18

## 2025-07-08 RX ADMIN — ACETAMINOPHEN 1000 MG: 500 TABLET, FILM COATED ORAL at 06:18

## 2025-07-08 ASSESSMENT — PAIN DESCRIPTION - LOCATION: LOCATION: HIP

## 2025-07-08 ASSESSMENT — PAIN DESCRIPTION - ORIENTATION: ORIENTATION: RIGHT

## 2025-07-08 ASSESSMENT — PAIN DESCRIPTION - DESCRIPTORS: DESCRIPTORS: ACHING

## 2025-07-08 ASSESSMENT — PAIN SCALES - GENERAL: PAINLEVEL_OUTOF10: 2

## 2025-07-08 NOTE — PROGRESS NOTES
07/07/25 1713   Treatment   Treatment Type IS   Oxygen Therapy/Pulse Ox   O2 Therapy Room air   O2 Device None (Room air)   O2 Flow Rate (L/min) 0 L/min   Pulse 75   SpO2 96 %   Pulse Oximeter Device Mode Continuous   Incentive Spirometry Tx   Treatment Effort Assisted by RT   Maximum Achieved Volume (mL) 1300 mL   Number of Breaths 3     Patient achieved   1300  Ml/sec on IS. Patient encouraged to do every hour while awake-patient agreed and demonstrated. No shortness of breath or distress noted. BS are clear b/l.   [facility] notes reviewed- continuous sat # ordered HS and placed on pt.  
   07/07/25 2153   Oxygen Therapy/Pulse Ox   O2 Therapy Room air   O2 Device None (Room air)   Pulse 79   Respirations 16   SpO2 95 %   Pulse Oximeter Device Mode Continuous   $Pulse Oximeter $Spot check (multiple/continuous)     Follow up from initial assessment    SOB evaluated: None noted    Breath Sounds: Clear    IS patient effort: Good  Patient achieved:   1200    Ml/sec    Patient resting comfortably, denies any discomfort at this time.  
4 Eyes Skin Assessment     NAME:  Brandy Grullon  YOB: 1950  MEDICAL RECORD NUMBER:  [id number]    The patient is being assessed for  Post-Op Surgical    I agree that at least one RN has performed a thorough Head to Toe Skin Assessment on the patient. ALL assessment sites listed below have been assessed.      Areas assessed by both nurses:    Head, Face, Ears, Shoulders, Back, Chest, Arms, Elbows, Hands, Sacrum. Buttock, Coccyx, Ischium, Legs. Feet and Heels, and Under Medical Devices         Does the Patient have a Wound? No noted wound(s)       [place] Prevention initiated by RN: Yes  Wound Care Orders initiated by RN: No    Pressure Injury (Stage 1,2,3,4, Unstageable, DTI, NWPT, and Complex wounds) if present, place Wound referral order by RN under : No    New Ostomies, if present place, Ostomy referral order under : No     Nurse 1 eSignature: Electronically signed by DUNCAN YU RN on 7/7/25 at 4:07 PM EDT    **SHARE this note so that the co-signing nurse can place an eSignature**    Nurse 2 eSignature: {Esignature:[id number]}     
ACUTE PHYSICAL THERAPY GOALS:   (Developed with and agreed upon by patient and/or caregiver.)  Pt will perform supine to/from sit with mod I in 7 treatment days.  Pt will perform sit to/from stand with mod I and LRAD in 7 treatment days.  Pt will ambulate 150 ft with mod I and LRAD in 7 treatment days.  Pt will negotiate 4 stairs with rail and CGA in 7 treatment days.  Pt will be independent with HEP in 7 days.      PHYSICAL THERAPY: TOTAL HIP ARTHROPLASTY Daily Note and AM  (Link to Caseload Tracking: PT Visit Days : 2  Acknowledge Orders  Time In/Out  PT Charge Capture  Rehab Caseload Tracker  Episode   Brandy Grullon is a 74 y.o. female   PRIMARY DIAGNOSIS: Primary osteoarthritis of right hip  Primary osteoarthritis of right hip [M16.11]  Osteoarthritis of right hip, unspecified osteoarthritis type [M16.11]  Procedure(s) (LRB):  Has [employer] Sameer/Murillo Pacemaker// Right HIP TOTAL ARTHROPLASTY, Depuy (Right)  1 Day Post-Op  Reason for Referral: Pain in Right Hip (M25.551)  Stiffness of Right Hip, Not elsewhere classified (M25.651)  Difficulty in walking, Not elsewhere classified (R26.2)  Outpatient in a bed: Payor: MEDICARE / Plan: MEDICARE PART A AND B / Product Type: *No Product type* /     REHAB RECOMMENDATIONS:   Recommendation to date pending progress:  Setting:  Home Health Therapy    Equipment:    Rolling Walker - only has 4 wheeled walker at home.     GAIT: I Mod I S SBA CGA Min Mod Max Total  NT x2 Comments:   Level of Assistance [] [] [] [x] [] [] [] [] [] [] []    Weightbearing Status  Right Lower Extremity Weight Bearing: Weight Bearing As Tolerated    Distance  100 (+ 100) feet    Gait Quality Antalgic    DME Rolling Walker     Stairs      Ramp     I=Independent, Mod I=Modified Independent, S=Supervision, SBA=Standby Assistance, CGA=Contact Guard Assistance,   Min=Minimal Assistance, Mod=Moderate Assistance, Max=Maximal Assistance, Total=Total Assistance, NT=Not Tested      ASSESSMENT: 
Discharge instructions reviewed and copy provided for pt. Opportunity provided for questions. Pt. verbalized understanding. IV was removed without difficulty. Pt taken to car via wc.  
OCCUPATIONAL THERAPY Daily Note, Discharge, and AM      (Link to Caseload Tracking: OT Visit Days: 2  OT Orders   Time  OT Charge Capture  Rehab Caseload Tracker  Episode     Brandy Grullon is a 74 y.o. female   PRIMARY DIAGNOSIS: Primary osteoarthritis of right hip  Primary osteoarthritis of right hip [M16.11]  Osteoarthritis of right hip, unspecified osteoarthritis type [M16.11]  Procedure(s) (LRB):  Has [employer]. Sameer/Murillo Pacemaker// Right HIP TOTAL ARTHROPLASTY, Depuy (Right)  1 Day Post-Op  Reason for Referral: Pain in Right Hip (M25.551)  Stiffness of Right Hip, Not elsewhere classified (M25.651)  Outpatient in a bed: Payor: MEDICARE / Plan: MEDICARE PART A AND B / Product Type: *No Product type* /     ASSESSMENT:     REHAB RECOMMENDATIONS:   Recommendation to date pending progress:  Setting:  No further skilled occupational therapy after discharge from hospital    Equipment:    Rolling Walker     ASSESSMENT:  Ms. Grullon is s/p right RAMESH.  Patient completed shower and dressing as charted below in ADL grid and is ambulating with rolling walker and stand by assist.  Patient has met 4/4 goals and plans to return home with good family support.  Family able to provide patient with appropriate level of assistance at this time.  OT reviewed hip precautions throughout session. Patient instructed to call for assistance when needing to get up from recliner and all needs in reach.  Patient verbalized understanding of call light.        [facility] AM-PAC™ “6 Clicks” Daily Activity Inpatient Short Form:     AM-PAC Daily Activity - Inpatient   How much help is needed for putting on and taking off regular lower body clothing?: A Lot  How much help is needed for bathing (which includes washing, rinsing, drying)?: A Lot  How much help is needed for toileting (which includes using toilet, bedpan, or urinal)?: A Little  How much help is needed for putting on and taking off regular upper body clothing?: None  How much 
OCCUPATIONAL THERAPY Initial Assessment, Daily Note, and PM      (Link to Caseload Tracking: OT Visit Days: 1  OT Orders   Time  OT Charge Capture  Rehab Caseload Tracker  Episode     Brandy Grullon is a 74 y.o. female   PRIMARY DIAGNOSIS: Primary osteoarthritis of right hip  Primary osteoarthritis of right hip [M16.11]  Osteoarthritis of right hip, unspecified osteoarthritis type [M16.11]  Procedure(s) (LRB):  Has [employer] Sameer/Murillo Pacemaker// Right HIP TOTAL ARTHROPLASTY, Depuy (Right)  * Day of Surgery *  Reason for Referral: Pain in Right Hip (M25.551)  Stiffness of Right Hip, Not elsewhere classified (M25.651)  Outpatient in a bed: Payor: MEDICARE / Plan: MEDICARE PART A AND B / Product Type: *No Product type* /     ASSESSMENT:     REHAB RECOMMENDATIONS:   Recommendation to date pending progress:  Setting:  No further skilled occupational therapy after discharge from hospital    Equipment:    Rolling Walker     ASSESSMENT:  Ms. Grullon is s/p right RAMESH and presents with decreased independence with functional mobility and activities of daily living as compared to baseline level of function and safety. Patient would benefit from skilled Occupational Therapy to maximize independence and safety with self-care task and functional mobility.   Patient able to complete  lower body dressing, toileting, and grooming at bathroom with minimal assist .  Mobilized from recliner/chair to bathroom using a rolling walker with assist. Educated patient  on safety with ADLs, safe use of RW, hip precautions and ice use.  Patient is hopeful to spend the night to better prepare for safe discharge home. Should progress well with ADL session in AM.       [facility] AM-PAC™ “6 Clicks” Daily Activity Inpatient Short Form:     AM-PAC Daily Activity - Inpatient   How much help is needed for putting on and taking off regular lower body clothing?: A Lot  How much help is needed for bathing (which includes washing, rinsing, 
Pt received to Rm 326. Pt alert and oriented. Oriented pt to room and bed controls.  at bedside.  
TRANSFER - IN REPORT:    Verbal report received from Dacia on Brandy Grullon  being received from PACU for routine post-op      Report consisted of patient's Situation, Background, Assessment and   Recommendations(SBAR).     Information from the following report(s) Nurse Handoff Report was reviewed with the receiving nurse.    Opportunity for questions and clarification was provided.      Assessment completed upon patient's arrival to unit and care assumed.     
assessed due to hip precautions.      LEFT LE Within Functional Limits   Abnormal   Comments   Strength [x] []     Range of Motion [x] []       UPPER EXTREMITY GROSS EVALUATION:     Within Functional Limits   Abnormal   Comments   Strength [x] []    Range of Motion [x] []      SENSATION  Sensation [x]       COGNITION/  PERCEPTION: Intact Impaired (Comments):   Orientation [x]     Vision [x]     Hearing [x]     Cognition  [x]       MOBILITY: I Mod I S SBA CGA Min Mod Max Total  NT x2 Comments:   Bed Mobility    Rolling [] [] [] [] [x] [] [] [] [] [] []    Supine to Sit [] [] [] [] [] [x] [] [] [] [] []    Scooting [] [] [] [] [] [x] [] [] [] [] []    Sit to Supine [] [] [] [] [] [] [] [] [] [x] []    Transfers    Sit to Stand [] [] [] [] [x] [] [] [] [] [] []    Bed to Chair [] [] [] [] [x] [] [] [] [] [] []    Stand to Sit [] [] [] [] [x] [] [] [] [] [] []    Stand Pivot [] [] [] [] [] [] [] [] [] [x] []    Toilet [] [] [] [] [] [] [] [] [] [x] []     [] [] [] [] [] [] [] [] [] [] []    I=Independent, Mod I=Modified Independent, S=Supervision, SBA=Standby Assistance, CGA=Contact Guard Assistance,   Min=Minimal Assistance, Mod=Moderate Assistance, Max=Maximal Assistance, Total=Total Assistance, NT=Not Tested    BALANCE: Good Fair+ Fair Fair- Poor NT Comments   Sitting Static [x] [] [] [] [] []    Sitting Dynamic [x] [] [] [] [] []              Standing Static [] [x] [] [] [] []    Standing Dynamic [] [] [x] [] [] []      PLAN:   FREQUENCY AND DURATION: BID for duration of hospital stay or until stated goals are met, whichever comes first.    THERAPY PROGNOSIS: Good    PROBLEM LIST:   (Skilled intervention is medically necessary to address:)  Decreased ADL/Functional Activities, Decreased Activity Tolerance, Decreased AROM/PROM, Decreased Gait Ability, Decreased Strength, Decreased Transfer Abilities, and Increased Pain   INTERVENTIONS PLANNED:   (Benefits and precautions of physical therapy have been discussed with

## 2025-07-08 NOTE — ANESTHESIA POSTPROCEDURE EVALUATION
Department of Anesthesiology  Postprocedure Note    Patient: Brandy Grullon  MRN: [id number]  YOB: 1950  Date of evaluation: 7/7/2025    Procedure Summary       Date: 07/07/25 Room / Location: [facility] MAIN OR 05 / E MAIN OR    Anesthesia Start: 1224 Anesthesia Stop: 1355    Procedure: Has St. Sameer/[employer]bbott Pacemaker// Right HIP TOTAL ARTHROPLASTY, Depuy (Right: Hip) Diagnosis:       Primary osteoarthritis of right hip      (Primary osteoarthritis of right hip [M16.11])    Surgeons: Anup Santacruz MD Responsible Provider: Dequan Soni MD    Anesthesia Type: spinal ASA Status: 3            Anesthesia Type: No value filed.    Ghazal Phase I: Ghazal Score: 8    Ghazal Phase II:      Anesthesia Post Evaluation    Patient location during evaluation: bedside  Patient participation: complete - patient participated  Level of consciousness: awake and alert  Airway patency: patent  Nausea & Vomiting: no vomiting  Cardiovascular status: hemodynamically stable  Respiratory status: acceptable  Hydration status: euvolemic  Comments: BP (!) 125/56   Pulse 79   Temp 97.3 °F (36.3 °C) (Oral)   Resp 18   Ht 1.626 m (5' 4\")   Wt 83.6 kg (184 lb 4.8 oz)   SpO2 94%   BMI 31.64 kg/m²    Pain management: adequate    No notable events documented.

## 2025-07-08 NOTE — PLAN OF CARE
Problem: Pain  Goal: Verbalizes/displays adequate comfort level or baseline comfort level  Outcome: Progressing     Problem: Safety - Adult  Goal: Free from fall injury  Outcome: Progressing  Flowsheets (Taken 7/7/2025 1611 by Darryl Lugo, RN)  Free From Fall Injury: Instruct family/caregiver on patient safety     Problem: Discharge Planning  Goal: Discharge to home or other facility with appropriate resources  Outcome: Progressing  Flowsheets (Taken 7/7/2025 1607 by Darryl Lugo RN)  Discharge to home or other facility with appropriate resources: Identify barriers to discharge with patient and caregiver     Problem: ABCDS Injury Assessment  Goal: Absence of physical injury  Outcome: Progressing

## 2025-07-21 ENCOUNTER — CLINICAL DOCUMENTATION (OUTPATIENT)
Dept: PHYSICAL THERAPY | Age: 75
End: 2025-07-21

## 2025-07-21 NOTE — THERAPY DISCHARGE
[facility] @ [address] [place]  [place] [address]  Phone: [phone]  Fax: [phone]    OUTPATIENT PHYSICAL THERAPY  Discontinuation Summary 7/21/2025  Episode  Appt Desk         Brandy Grullon has been seen in physical therapy for 11  visits from 12/10/24 to 4/17/25, with 1 cancellations and 0 no shows. Ms. Grullon's therapy has come to an end at this time due to: Patient did not return for/schedule additional treatment    Physical Therapy Goals:  Not assessed at time of d/c    Ewelina Morrell, PT

## 2025-07-22 DIAGNOSIS — Z96.641 S/P TOTAL RIGHT HIP ARTHROPLASTY: Primary | ICD-10-CM

## 2025-07-29 ENCOUNTER — OFFICE VISIT (OUTPATIENT)
Dept: ORTHOPEDIC SURGERY | Age: 75
End: 2025-07-29

## 2025-07-29 DIAGNOSIS — M16.11 PRIMARY OSTEOARTHRITIS OF RIGHT HIP: Primary | ICD-10-CM

## 2025-07-29 DIAGNOSIS — Z96.641 STATUS POST RIGHT HIP REPLACEMENT: ICD-10-CM

## 2025-07-29 PROCEDURE — 99024 POSTOP FOLLOW-UP VISIT: CPT | Performed by: NURSE PRACTITIONER

## 2025-07-29 RX ORDER — AMOXICILLIN 500 MG/1
TABLET, FILM COATED ORAL
Qty: 12 TABLET | Refills: 1 | Status: SHIPPED | OUTPATIENT
Start: 2025-07-29

## 2025-07-29 NOTE — PROGRESS NOTES
Patient ID:  Brandy Grullon  [id number]  74 y.o.  1950    Today: July 29, 2025       CHIEF COMPLAINT:  Follow-up of right total hip replacement    HISTORY:  The patient presents today for 3-week follow-up after total hip replacement.  The patient continues to improve gradually.  Working with physical therapy on strengthening and stretching.  They are on aspirin for DVT prophylaxis.  Using assistive walking device appropriately.  Continues to take medication appropriately.      PE:  Incision is examined which is healing well.  No erythema, induration or drainage.  No significant fluid accumulation around the surgical site distally.   Distally able to grossly plantar and dorsiflex foot and ankle.  Sensation intact.  Limb is perfused.  No sign of DVT.    X-RAYS:    XR Pelvis 2/3 Views  Views Obtained: AP Pelvis and Frog leg lateral of right hip  Indication: Postop Right Total Hip Replacement  Findings:   X-rays are viewed which show total hip replacement in place on the right side.  All hardware appears to be stable compared to immediate postoperative films.  The acetabular component appears to be in good position, no evidence of loosening.  Anteversion and inclination angle appear to be within acceptable criteria.  The stem appears to be appropriately sized without any evidence of subsidence.  No evidence of proximal femoral periprosthetic fracture.  Hip is reduced.   Impression: Normal Xray after total hip replacement    JEFF ALARCON, MARKELL - CNP    ASSESSMENT:  3 Weeks S/P Right Total Hip Replacement    PLAN:  Continue activity and current weight bearing status.  Continue posterior hip precautions if applicable.  I will send a referral to outpatient physical therapy.  Continue to take the aspirin for another week for a full month of DVT prophylaxis.  They are given a refill on their pain medication if they feel they are going to run out.  The patient is given a script for antibiotics to be taken for

## 2025-08-04 ENCOUNTER — HOSPITAL ENCOUNTER (OUTPATIENT)
Dept: PHYSICAL THERAPY | Age: 75
Setting detail: RECURRING SERIES
Discharge: HOME | End: 2025-08-07
Payer: MEDICARE

## 2025-08-04 DIAGNOSIS — R26.2 DIFFICULTY IN WALKING, NOT ELSEWHERE CLASSIFIED: Primary | ICD-10-CM

## 2025-08-04 DIAGNOSIS — M25.551 PAIN IN RIGHT HIP: ICD-10-CM

## 2025-08-04 DIAGNOSIS — Z96.641 STATUS POST TOTAL REPLACEMENT OF RIGHT HIP: ICD-10-CM

## 2025-08-04 PROCEDURE — 97162 PT EVAL MOD COMPLEX 30 MIN: CPT

## 2025-08-04 PROCEDURE — 97110 THERAPEUTIC EXERCISES: CPT

## 2025-08-11 ENCOUNTER — HOSPITAL ENCOUNTER (OUTPATIENT)
Dept: PHYSICAL THERAPY | Age: 75
Setting detail: RECURRING SERIES
Discharge: HOME | End: 2025-08-14
Payer: MEDICARE

## 2025-08-11 PROCEDURE — 97140 MANUAL THERAPY 1/> REGIONS: CPT

## 2025-08-11 PROCEDURE — 97110 THERAPEUTIC EXERCISES: CPT

## 2025-08-13 ENCOUNTER — HOSPITAL ENCOUNTER (OUTPATIENT)
Dept: PHYSICAL THERAPY | Age: 75
Setting detail: RECURRING SERIES
Discharge: HOME | End: 2025-08-16
Payer: MEDICARE

## 2025-08-13 PROCEDURE — 97110 THERAPEUTIC EXERCISES: CPT

## 2025-08-18 ENCOUNTER — HOSPITAL ENCOUNTER (OUTPATIENT)
Dept: PHYSICAL THERAPY | Age: 75
Setting detail: RECURRING SERIES
Discharge: HOME | End: 2025-08-21
Payer: MEDICARE

## 2025-08-18 PROCEDURE — 97110 THERAPEUTIC EXERCISES: CPT

## 2025-08-20 ENCOUNTER — HOSPITAL ENCOUNTER (OUTPATIENT)
Dept: PHYSICAL THERAPY | Age: 75
Setting detail: RECURRING SERIES
Discharge: HOME | End: 2025-08-23
Payer: MEDICARE

## 2025-08-20 PROCEDURE — 97110 THERAPEUTIC EXERCISES: CPT

## 2025-08-25 ENCOUNTER — APPOINTMENT (OUTPATIENT)
Dept: PHYSICAL THERAPY | Age: 75
End: 2025-08-25
Payer: MEDICARE

## 2025-08-27 ENCOUNTER — HOSPITAL ENCOUNTER (OUTPATIENT)
Dept: PHYSICAL THERAPY | Age: 75
Setting detail: RECURRING SERIES
Discharge: HOME | End: 2025-08-30
Payer: MEDICARE

## 2025-08-27 PROCEDURE — 97110 THERAPEUTIC EXERCISES: CPT

## 2025-09-03 ENCOUNTER — HOSPITAL ENCOUNTER (OUTPATIENT)
Dept: PHYSICAL THERAPY | Age: 75
Setting detail: RECURRING SERIES
Discharge: HOME | End: 2025-09-06
Payer: MEDICARE

## 2025-09-03 PROCEDURE — 97110 THERAPEUTIC EXERCISES: CPT

## (undated) DEVICE — APPLICATOR MEDICATED 3 CC SOLUTION HI LT ORNG CHLORAPREP 930415

## (undated) DEVICE — PAD,NON-ADHERENT,3X8,STERILE,LF,1/PK: Brand: CURAD

## (undated) DEVICE — SOLUTION WND IRRIGATION 450 ML 0.5 PVP-I 0.9 NACL

## (undated) DEVICE — SOLUTION IRRIG 3000ML 0.9% SOD CHL USP UROMATIC PLAS CONT

## (undated) DEVICE — JELLY LUBRICATING 10GM PREFIL SYR LUBE

## (undated) DEVICE — STERILE SYNTHETIC POLYISOPRENE POWDER-FREE SURGICAL GLOVES WITH HYDROGEL COATING, SMOOTH FINISH, STRAIGHT FINGER: Brand: PROTEXIS

## (undated) DEVICE — SUTURE FIBERWIRE SZ 2 W/ TAPERED NEEDLE BLUE L38IN NONABSORB BLU L26.5MM 1/2 CIRCLE AR7200

## (undated) DEVICE — SUTURE MONOCRYL STRATAFIX SPRL + SZ 3-0 L18IN ABSRB UD PS-2 SXMP1B107

## (undated) DEVICE — GLOVE SURG SZ 65 L12IN FNGR THK79MIL GRN LTX FREE

## (undated) DEVICE — TOTAL HIP PACK: Brand: MEDLINE INDUSTRIES, INC.

## (undated) DEVICE — SUTURE ABSORBABLE MONOFILAMENT 1 CTX 36 CM 48 MM VIO PDS +

## (undated) DEVICE — PIN FIX TROCAR PT 1 END 9/64X9 IN 1 PT STYL SMOOTH PLN STRL
Type: IMPLANTABLE DEVICE | Site: HIP | Status: NON-FUNCTIONAL
Removed: 2025-07-07

## (undated) DEVICE — PAD,ABDOMINAL,5"X9",ST,LF,25/BX: Brand: MEDLINE

## (undated) DEVICE — SYRINGE MED 10ML LUERLOCK TIP W/O SFTY DISP

## (undated) DEVICE — DRAPE,TOP,102X53,STERILE: Brand: MEDLINE

## (undated) DEVICE — SOLUTION IV 250ML 0.9% SOD CHL PH 5 INJ USP VIAFLX PLAS

## (undated) DEVICE — STRYKER PERFORMANCE SERIES SAGITTAL BLADE: Brand: STRYKER PERFORMANCE SERIES

## (undated) DEVICE — GRIPPER SURGICAL RETRACTOR DISP

## (undated) DEVICE — SUIT SURG ISOLATN ZIP TOGA 3XL T7 +

## (undated) DEVICE — 1010 S-DRAPE TOWEL DRAPE 10/BX: Brand: STERI-DRAPE™

## (undated) DEVICE — BIPOLAR SEALER 23-112-1 AQM 6.0: Brand: AQUAMANTYS ®

## (undated) DEVICE — BANDAGE,GAUZE,BULKEE II,4.5"X4.1YD,STRL: Brand: MEDLINE

## (undated) DEVICE — SUIT SURG ISOLATN ZIP TOGA XL T7 +

## (undated) DEVICE — SUTURE MONOCRYL SZ 2-0 L27IN ABSRB UD CP-1 1 L36MM 1/2 CIR REV Y266H

## (undated) DEVICE — GLOVE SURG SZ 9 THK91MIL LTX FREE SYN POLYISOPRENE ANTI

## (undated) DEVICE — STERILE PVP: Brand: MEDLINE INDUSTRIES, INC.

## (undated) DEVICE — NEEDLE HYPO 21GA L1.5IN INTRAMUSCULAR S STL LATCH BVL UP

## (undated) DEVICE — GLOVE SURG SZ 8 L12IN FNGR THK79MIL GRN LTX FREE

## (undated) DEVICE — BASIC SINGLE BASIN 2-LF: Brand: MEDLINE INDUSTRIES, INC.

## (undated) DEVICE — GLOVE SURG SZ 65 THK91MIL LTX FREE SYN POLYISOPRENE

## (undated) DEVICE — YANKAUER,FLEXIBLE HANDLE,REGLR CAPACITY: Brand: MEDLINE INDUSTRIES, INC.

## (undated) DEVICE — CONTAINER,SPECIMEN,O.R.STRL,4.5OZ: Brand: MEDLINE

## (undated) DEVICE — SOLUTION IRRIG 1000ML 0.9% SOD CHL USP POUR PLAS BTL

## (undated) DEVICE — SOLUTION IRRIG 1000ML STRL H2O USP PLAS POUR BTL

## (undated) DEVICE — COVER,MAYO STAND,XL,STERILE: Brand: MEDLINE

## (undated) DEVICE — HEWSON SUTURE RETRIEVER: Brand: HEWSON SUTURE RETRIEVER